# Patient Record
Sex: FEMALE | Race: WHITE | NOT HISPANIC OR LATINO | Employment: OTHER | URBAN - METROPOLITAN AREA
[De-identification: names, ages, dates, MRNs, and addresses within clinical notes are randomized per-mention and may not be internally consistent; named-entity substitution may affect disease eponyms.]

---

## 2023-09-26 PROBLEM — K20.0 EOSINOPHILIC ESOPHAGITIS: Status: ACTIVE | Noted: 2023-07-19

## 2023-09-26 PROBLEM — F32.A DEPRESSION: Status: ACTIVE | Noted: 2023-09-26

## 2023-09-26 PROBLEM — R16.1 SPLENOMEGALY: Status: ACTIVE | Noted: 2020-09-15

## 2023-09-26 PROBLEM — D84.9 IMMUNOCOMPROMISED: Status: ACTIVE | Noted: 2020-06-18

## 2023-09-26 PROBLEM — M54.42 ACUTE LEFT-SIDED LOW BACK PAIN WITH LEFT-SIDED SCIATICA: Status: ACTIVE | Noted: 2022-09-08

## 2023-09-26 PROBLEM — M54.40 CHRONIC BILATERAL LOW BACK PAIN WITH SCIATICA: Status: ACTIVE | Noted: 2023-09-26

## 2023-09-26 PROBLEM — K21.9 GERD (GASTROESOPHAGEAL REFLUX DISEASE): Status: ACTIVE | Noted: 2023-09-26

## 2023-09-26 PROBLEM — K75.81 NASH (NONALCOHOLIC STEATOHEPATITIS): Status: ACTIVE | Noted: 2023-09-26

## 2023-09-26 PROBLEM — G89.29 CHRONIC BILATERAL LOW BACK PAIN WITH SCIATICA: Status: ACTIVE | Noted: 2023-09-26

## 2023-09-26 PROBLEM — K31.84 GASTROPARESIS: Status: ACTIVE | Noted: 2023-09-26

## 2023-09-26 PROBLEM — K44.9 HIATAL HERNIA: Status: ACTIVE | Noted: 2023-09-26

## 2023-09-26 PROBLEM — F98.8 ADD (ATTENTION DEFICIT DISORDER): Status: ACTIVE | Noted: 2023-09-26

## 2023-09-26 PROBLEM — R53.1 WEAKNESS: Status: ACTIVE | Noted: 2023-09-26

## 2023-09-26 PROBLEM — N94.6 DYSMENORRHEA: Status: ACTIVE | Noted: 2023-09-26

## 2023-09-26 PROBLEM — M54.42 CHRONIC BILATERAL LOW BACK PAIN WITH SCIATICA: Status: ACTIVE | Noted: 2023-09-26

## 2023-09-26 PROBLEM — R42 VERTIGO: Status: ACTIVE | Noted: 2023-09-26

## 2023-09-26 PROBLEM — G50.0 TRIGEMINAL NEURALGIA: Status: ACTIVE | Noted: 2023-09-26

## 2023-09-26 PROBLEM — C91.Z0 LARGE GRANULAR LYMPHOCYTE DISORDER: Status: ACTIVE | Noted: 2021-02-15

## 2023-09-26 PROBLEM — G47.33 OSA (OBSTRUCTIVE SLEEP APNEA): Status: ACTIVE | Noted: 2023-09-26

## 2023-09-26 PROBLEM — M79.7 FIBROMYALGIA: Status: ACTIVE | Noted: 2023-09-26

## 2023-09-26 PROBLEM — R53.82 CHRONIC FATIGUE: Status: ACTIVE | Noted: 2023-09-26

## 2023-09-26 PROBLEM — K57.90 DIVERTICULOSIS: Status: ACTIVE | Noted: 2023-09-26

## 2023-09-26 PROBLEM — K57.20 DIVERTICULITIS OF LARGE INTESTINE WITH PERFORATION WITHOUT BLEEDING: Status: ACTIVE | Noted: 2020-06-17

## 2023-09-26 PROBLEM — M54.41 CHRONIC BILATERAL LOW BACK PAIN WITH SCIATICA: Status: ACTIVE | Noted: 2023-09-26

## 2023-09-26 PROBLEM — K57.20 DIVERTICULITIS OF LARGE INTESTINE WITH PERFORATION WITHOUT BLEEDING: Status: RESOLVED | Noted: 2020-06-17 | Resolved: 2023-09-26

## 2023-09-26 PROBLEM — F42.9 OCD (OBSESSIVE COMPULSIVE DISORDER): Status: ACTIVE | Noted: 2023-09-26

## 2023-09-26 PROBLEM — R13.10 DYSPHAGIA: Status: ACTIVE | Noted: 2023-09-26

## 2023-09-26 PROBLEM — D72.829 LEUKOCYTOSIS: Status: ACTIVE | Noted: 2020-12-11

## 2023-09-26 RX ORDER — OXCARBAZEPINE 150 MG/1
150 TABLET, FILM COATED ORAL 2 TIMES DAILY
COMMUNITY
Start: 2023-07-03

## 2023-09-26 RX ORDER — MELATONIN
1000 DAILY
COMMUNITY

## 2023-09-26 RX ORDER — MECLIZINE HYDROCHLORIDE 25 MG/1
25 TABLET ORAL 3 TIMES DAILY PRN
COMMUNITY
Start: 2023-04-27

## 2023-09-26 RX ORDER — FAMOTIDINE 20 MG/1
20 TABLET, FILM COATED ORAL 2 TIMES DAILY
COMMUNITY
Start: 2023-08-08 | End: 2023-09-27

## 2023-09-26 RX ORDER — DOXYCYCLINE HYCLATE 100 MG/1
100 CAPSULE ORAL 2 TIMES DAILY
COMMUNITY
Start: 2023-08-08 | End: 2023-09-27

## 2023-09-26 RX ORDER — LISDEXAMFETAMINE DIMESYLATE CAPSULES 50 MG/1
50 CAPSULE ORAL DAILY
Qty: 30 CAPSULE | Refills: 2 | COMMUNITY
Start: 2023-08-08 | End: 2023-10-15

## 2023-09-26 RX ORDER — FLUTICASONE PROPIONATE 220 UG/1
2 AEROSOL, METERED RESPIRATORY (INHALATION)
COMMUNITY
Start: 2023-08-16

## 2023-09-26 RX ORDER — ONDANSETRON 4 MG/1
4 TABLET, ORALLY DISINTEGRATING ORAL EVERY 8 HOURS PRN
COMMUNITY

## 2023-09-26 RX ORDER — PANTOPRAZOLE SODIUM 40 MG/1
40 TABLET, DELAYED RELEASE ORAL DAILY
COMMUNITY
Start: 2023-08-10

## 2023-09-27 ENCOUNTER — HOSPITAL ENCOUNTER (OUTPATIENT)
Dept: CARDIOLOGY | Facility: HOSPITAL | Age: 38
Discharge: HOME | End: 2023-09-27
Payer: COMMERCIAL

## 2023-09-27 ENCOUNTER — CONSULT (OUTPATIENT)
Dept: BARIATRICS/WEIGHT MGMT | Facility: CLINIC | Age: 38
End: 2023-09-27
Payer: COMMERCIAL

## 2023-09-27 ENCOUNTER — LAB (OUTPATIENT)
Dept: LAB | Facility: HOSPITAL | Age: 38
End: 2023-09-27
Payer: COMMERCIAL

## 2023-09-27 ENCOUNTER — HOSPITAL ENCOUNTER (OUTPATIENT)
Dept: GENERAL RADIOLOGY | Facility: HOSPITAL | Age: 38
Discharge: HOME | End: 2023-09-27
Payer: COMMERCIAL

## 2023-09-27 VITALS — HEIGHT: 65 IN | WEIGHT: 266 LBS | TEMPERATURE: 99.1 F | BODY MASS INDEX: 44.32 KG/M2

## 2023-09-27 DIAGNOSIS — M79.7 FIBROMYALGIA: ICD-10-CM

## 2023-09-27 DIAGNOSIS — G89.29 CHRONIC BILATERAL LOW BACK PAIN WITH SCIATICA, SCIATICA LATERALITY UNSPECIFIED: ICD-10-CM

## 2023-09-27 DIAGNOSIS — F32.89 OTHER DEPRESSION: ICD-10-CM

## 2023-09-27 DIAGNOSIS — E66.01 MORBID OBESITY WITH BODY MASS INDEX (BMI) OF 40.0 TO 44.9 IN ADULT: ICD-10-CM

## 2023-09-27 DIAGNOSIS — G50.0 TRIGEMINAL NEURALGIA: ICD-10-CM

## 2023-09-27 DIAGNOSIS — R13.19 OTHER DYSPHAGIA: ICD-10-CM

## 2023-09-27 DIAGNOSIS — C91.Z0 LARGE GRANULAR LYMPHOCYTE DISORDER: ICD-10-CM

## 2023-09-27 DIAGNOSIS — K44.9 HIATAL HERNIA: ICD-10-CM

## 2023-09-27 DIAGNOSIS — K76.0 FATTY LIVER: ICD-10-CM

## 2023-09-27 DIAGNOSIS — G35 MULTIPLE SCLEROSIS: ICD-10-CM

## 2023-09-27 DIAGNOSIS — R53.82 CHRONIC FATIGUE: ICD-10-CM

## 2023-09-27 DIAGNOSIS — K21.00 GASTROESOPHAGEAL REFLUX DISEASE WITH ESOPHAGITIS WITHOUT HEMORRHAGE: ICD-10-CM

## 2023-09-27 DIAGNOSIS — N94.6 DYSMENORRHEA: ICD-10-CM

## 2023-09-27 DIAGNOSIS — G47.33 OSA (OBSTRUCTIVE SLEEP APNEA): ICD-10-CM

## 2023-09-27 DIAGNOSIS — K20.0 EOSINOPHILIC ESOPHAGITIS: ICD-10-CM

## 2023-09-27 DIAGNOSIS — M54.40 CHRONIC BILATERAL LOW BACK PAIN WITH SCIATICA, SCIATICA LATERALITY UNSPECIFIED: ICD-10-CM

## 2023-09-27 DIAGNOSIS — E66.01 MORBID OBESITY WITH BODY MASS INDEX (BMI) OF 40.0 TO 44.9 IN ADULT: Primary | ICD-10-CM

## 2023-09-27 PROBLEM — M54.42 ACUTE LEFT-SIDED LOW BACK PAIN WITH LEFT-SIDED SCIATICA: Status: RESOLVED | Noted: 2022-09-08 | Resolved: 2023-09-27

## 2023-09-27 PROBLEM — K57.30 PANCOLONIC DIVERTICULOSIS: Status: ACTIVE | Noted: 2023-09-27

## 2023-09-27 PROBLEM — K57.90 DIVERTICULOSIS: Status: RESOLVED | Noted: 2023-09-26 | Resolved: 2023-09-27

## 2023-09-27 PROBLEM — Q79.60 EDS (EHLERS-DANLOS SYNDROME): Status: ACTIVE | Noted: 2023-09-27

## 2023-09-27 PROBLEM — K75.81 NASH (NONALCOHOLIC STEATOHEPATITIS): Status: RESOLVED | Noted: 2023-09-26 | Resolved: 2023-09-27

## 2023-09-27 LAB
HBA1C MFR BLD: 5.1 % (ref 4.8–5.6)
QT INTERVAL: 392 MS
QTC INTERVAL: 405 MS
TSH SERPL DL<=0.05 MIU/L-ACNC: 2.3 UIU/ML (ref 0.27–4.2)

## 2023-09-27 PROCEDURE — 93005 ELECTROCARDIOGRAM TRACING: CPT | Performed by: NURSE PRACTITIONER

## 2023-09-27 PROCEDURE — 84443 ASSAY THYROID STIM HORMONE: CPT

## 2023-09-27 PROCEDURE — 36415 COLL VENOUS BLD VENIPUNCTURE: CPT

## 2023-09-27 PROCEDURE — 83036 HEMOGLOBIN GLYCOSYLATED A1C: CPT

## 2023-09-27 PROCEDURE — 71046 X-RAY EXAM CHEST 2 VIEWS: CPT

## 2023-09-27 NOTE — PROGRESS NOTES
[facility] BARIATRIC SURGERY  [address] [facility] [address]  [facility] [id number] [phone]  [address] [facility] [address]  [facility] [id number] [phone]  Dept: [phone]  9/27/2023      Swetha HATFIELD.  [id number]  [id number]  1985  female      Chief Complaint of weight gain; unable to maintain weight loss    History of Present Illness:   Swetha is a 38 y.o. female who presents today for evaluation, education and consultation regarding weight loss surgery. The patient is interested in the gastric bypass.      Diet History:Swetha has been overweight for at least 25+ years, has been 35 pounds or more overweight for at least 25 years, has been 100 pounds or more overweight for 20 or more years and started dieting at age 17.  The most weight Swetha lost was 35 pounds on Keto and maintained the weight loss for about a year and a half. Swetha describes her eating habits as snacking between meals, eating sweets, drinking caffeine containing drinks, eating large meals, drinking regular soda, and emotional eating. Swetha HATFIELD has tried Atkins, Nutrisystem, Weight Watchers, Fasting, Physician monitored, Dietician monitored, reduced calorie, exercising, OTC medications, prescription medications, previous weight loss surgery, Ketogenic or high fat, and meal replacement shakes among others with success of losing up to 35 pounds, but in each instance regained the weight.     See dietician documentation for complete history.    Bariatric Surgery Evaluation: The patient is being seen for an initial visit for bariatric surgery evaluation and education.     Bariatric Co-morbidities:  sleep apnea, osteoarthritis, back pain, fibromyalgia, GERD, depression, and mental health disease    Patient Active Problem List   Diagnosis    Splenomegaly    Social anxiety disorder    SAVANNAH (obstructive sleep apnea)    Multiple sclerosis    Migraine headache without aura    Large granular  lymphocyte disorder    Leukocytosis    Immunocompromised    Hypersomnolence    Hearing loss in right ear    Eosinophilic esophagitis    ADD (attention deficit disorder)    Chronic fatigue    GERD (gastroesophageal reflux disease)    Hiatal hernia    Dysphagia    Gastroparesis as a child    Dysmenorrhea    Chronic bilateral low back pain with sciatica    Fibromyalgia    Trigeminal neuralgia    OCD (obsessive compulsive disorder)    Vertigo    Weakness    Depression    Fatty liver    EDS (Esdras-Danlos syndrome)    Morbid obesity with body mass index (BMI) of 40.0 to 44.9 in adult    Pancolonic diverticulosis       Past Medical History:   Diagnosis Date    Dysmenorrhea     Dysphagia     Fatty liver     Fibromyalgia     GERD (gastroesophageal reflux disease)     Hiatal hernia     Multiple sclerosis     OCD (obsessive compulsive disorder)     SAVANNAH (obstructive sleep apnea)     Trigeminal neuralgia        Past Surgical History:   Procedure Laterality Date    BONE MARROW ASPIRATE AND BIOPSY WIITH LUMBAR PUNCTURE  2020    COLONOSCOPY  2020    COLPOSCOPY W/ BIOPSY / CURETTAGE  2013    ENDOSCOPY  08/10/2023    dx eosinophilic esophagitis    MOHS SURGERY      melanoma    PORTACATH PLACEMENT  2016    TYMPANOSTOMY      1988,1991,1994       Allergies   Allergen Reactions    Latex Rash    Shellfish Allergy Rash         Current Outpatient Medications:     cholecalciferol (VITAMIN D3) 25 MCG (1000 UT) tablet, Take 1 tablet by mouth Daily., Disp: , Rfl:     fluticasone (FLOVENT HFA) 220 MCG/ACT inhaler, Take 2 puffs by mouth 2 (Two) Times a Day., Disp: , Rfl:     linaclotide (LINZESS) 145 MCG capsule capsule, Take 1 capsule by mouth Daily., Disp: , Rfl:     lisdexamfetamine (VYVANSE) 50 MG capsule, Take 1 capsule by mouth Daily, Disp: 30 capsule, Rfl: 2    meclizine (ANTIVERT) 25 MG tablet, Take 1 tablet by mouth 3 (Three) Times a Day As Needed., Disp: , Rfl:     natalizumab (TYSABRI) 300 MG/15ML injection, Infuse 15 mL into a venous  catheter As Needed (every 42 days)., Disp: , Rfl:     ondansetron ODT (ZOFRAN-ODT) 4 MG disintegrating tablet, Place 1 tablet on the tongue Every 8 (Eight) Hours As Needed for Nausea or Vomiting., Disp: , Rfl:     OXcarbazepine (TRILEPTAL) 150 MG tablet, Take 1 tablet by mouth 2 (Two) Times a Day., Disp: , Rfl:     pantoprazole (PROTONIX) 40 MG EC tablet, Take 1 tablet by mouth Daily., Disp: , Rfl:     Social History     Socioeconomic History    Marital status:    Tobacco Use    Smoking status: Never    Smokeless tobacco: Never   Vaping Use    Vaping Use: Never used   Substance and Sexual Activity    Alcohol use: Never    Drug use: Never    Sexual activity: Defer       Family History   Problem Relation Age of Onset    Sleep apnea Mother     Ovarian cancer Mother     OCD Mother     Hepatitis Mother     Obesity Father     Sleep apnea Father     Diabetes Father     Heart disease Father     Hepatitis Father     Obesity Sister     Bipolar disorder Sister     Heart disease Brother     Sleep apnea Brother     Diabetes Brother     Obesity Brother     Hypertension Brother     Heart attack Brother 37    Sleep apnea Maternal Grandmother     Heart disease Maternal Grandmother     Heart attack Maternal Grandmother     Hypertension Maternal Grandmother     Diabetes Maternal Grandmother     Throat cancer Maternal Grandfather     Hypertension Paternal Grandmother     Hypertension Paternal Grandfather     Lung cancer Paternal Grandfather          Review of Systems:  Review of Systems   Respiratory:  Negative for shortness of breath.    Cardiovascular:  Negative for chest pain.   Gastrointestinal:         GERD, dysphagia   Musculoskeletal:  Positive for arthralgias, back pain and joint swelling.   All other systems reviewed and are negative.    Physical Exam:  Vital Signs:  Weight: 121 kg (266 lb)   Body mass index is 44 kg/m².  Temp: 99.1 °F (37.3 °C)             Physical Exam  Vitals reviewed.   Constitutional:        General: She is not in acute distress.     Appearance: Normal appearance. She is morbidly obese.   HENT:      Head: Normocephalic and atraumatic.      Mouth/Throat:      Mouth: Mucous membranes are moist.   Eyes:      General: No scleral icterus.     Extraocular Movements: Extraocular movements intact.      Conjunctiva/sclera: Conjunctivae normal.      Pupils: Pupils are equal, round, and reactive to light.   Cardiovascular:      Rate and Rhythm: Normal rate and regular rhythm.      Heart sounds: Normal heart sounds. No murmur heard.    No gallop.   Pulmonary:      Effort: Pulmonary effort is normal. No respiratory distress.      Breath sounds: Normal breath sounds.   Abdominal:      General: Bowel sounds are normal. There is no distension.      Palpations: Abdomen is soft. There is no mass.      Tenderness: There is no abdominal tenderness. There is no guarding.   Musculoskeletal:         General: Normal range of motion.      Cervical back: Normal range of motion and neck supple.   Skin:     General: Skin is warm and dry.   Neurological:      General: No focal deficit present.      Mental Status: She is alert and oriented to person, place, and time.   Psychiatric:         Mood and Affect: Mood normal.         Behavior: Behavior normal.         Thought Content: Thought content normal.         Judgment: Judgment normal.          Assessment:         Swetha HATFIELD is a 38 y.o. year old female with medically complicated severe obesity. Weight: 121 kg (266 lb), Body mass index is 44 kg/m². and weight related problems including sleep apnea, osteoarthritis, back pain, fibromyalgia, GERD, depression, and mental health disease.    I explained in detail, potential surgical options of interest to the patient including the RNY gastric bypass, sleeve gastrectomy, and gastric band while considering the patient's medical history. At this time, the patient expressed interest in the Laparoscopic RNY Bypass  All of those procedures  can be performed laparoscopically but there is a chance to convert to open if any technical challenges or complications do occur.  Bariatric surgery is not cosmetic surgery but rather a tool to help a patient make a life-long commitment lifestyle changes including diet, exercise, behavior changes, and taking supplemental vitamins and minerals.    Due to the patient's BMI, history, and co-morbidities related to potential surgical complications were evaluated. Due to Swetha HATFIELD's risk factors female will obtain the following prior to being scheduled for surgical intervention:    A letter of medical support as well as a history and physical must be obtained from her primary care provider. The patient was given a copy of a sample form, that will suffice as their letter to take to their primary are provider.    A referral for pre-operative psychological evaluation was ordered for the patient to evaluate candidacy as well as provide mental health support, should it be warranted before or after surgery.    Consultant notes from neurology and notes from primary care provider, labs, previous EGD were reviewed  and  EKG, CXR, psychology clearance, Hba1c, and TSHwere ordered at this time. These will be drawn and patient will be notified with results. Patient will complete new, pre-operative radiology prior to being scheduled for surgery.    COMPREHENSIVE METABOLIC PANEL (07/03/2023 17:18)    CBC AND DIFFERENTIAL (07/03/2023 17:18)    Swetha HATFIELD has been diagnosed with SAVANNAH. The risks, as they relate to chronic hypercapnia r/t untreated SAVANNAH were discussed with the patient and She verbalized understanding.     ENDOSCOPY  Swetha Hatfield Procedure Date No Time: 8/10/2023   Procedure: Upper GI endoscopy  Pre-op Diagnosis: Dysphagia, Heartburn, Follow-up of eosinophilic   esophagitis  Providers: PAPITO LOVETT MD   Post-op Diagnosis: - Esophageal mucosal changes consistent with   eosinophilic esophagitis. Biopsied.  -  Z-line regular, 38 cm from the incisors.  - Normal stomach. Biopsied.  - Normal examined duodenum.     As this patient is a female of childbearing age she was counseled regarding conception and pregnancy after surgery. Patient was advised that conception and pregnancy within the first 18 months following surgery is not advised due to increased metabolic demands required during pregnancy as well as increased risk of nutrient and vitamin deficiencies which could jeopardize fetal development and birth weight.    The risks, benefits, alternatives, and potential complications of all of the gastric bypass explained in detail including, but not limited to death, anesthesia and medication adverse effect/DVT, pulmonary embolism, trocar site/incisional hernia, wound infection, abdominal infection, bleeding, failure to lose weight or gain weight and change in body image, metabolic complications with calcium, thiamine, vitamin B12, folate, iron, and anemia.    The patient was advised to start a high protein, low fat and low carbohydrate diet  start routine exercise including but not limited to 150 minutes per week. The patient received a resistance band along with a handout of exercises. The patient was given individualized information by our dietician along with handouts.     The patient was given information regarding the KHADIJAH educational video. KHADIJAH is an internet based educational video which explains the sourgical procedure and answers basic questions regarding the procedure. The patient was provided with instructions and a password to watch the video.    The patient understands the surgical procedures and the different surgical options that are available.  She understands the lifestyle changes that would be required after surgery and has agreed to participate in a pre-operative and postoperative weight management program.  She also expressed understanding of possible risks, had several questions answered and desires to  proceed.    I think she is a good candidate for this surgery, and is interested in a gastric bypass.    Encounter Diagnoses   Name Primary?    Morbid obesity with body mass index (BMI) of 40.0 to 44.9 in adult Yes    Gastroesophageal reflux disease with esophagitis without hemorrhage     Hiatal hernia     Eosinophilic esophagitis     Other dysphagia     Fatty liver     Dysmenorrhea     Large granular lymphocyte disorder     Other depression     Fibromyalgia     Chronic bilateral low back pain with sciatica, sciatica laterality unspecified     Trigeminal neuralgia     Multiple sclerosis     SAVANNAH (obstructive sleep apnea)     Chronic fatigue        Plan:    Patient will be evaluated by a bariatric dietician psychologist before undergoing a multidisciplinary review of her candidacy. We discussed weight loss requirements prior to surgery and rationale, as well as other program requirements to ensure the safest approach to surgery. We spent time discussing different surgical procedures and plan of care throughout their lifespan to ensure long term success in achieving and maintaining a healthier weight. Patient will proceed with preoperative lab work, radiology, and endoscopy after obtaining agreed upon specialty, clearances, sleep study, and letter of support from her primary care provider.    Total time spent during this encounter today was 60 minutes and includes preparing for the visit, reviewing tests, performing a medically appropriate examination and/or evaluations, counseling and educating the patient/family/caregiver, ordering medications, tests, or procedures, documenting information in the medical record, and independently interpreting results and communicating that information with the patient/family/caregiver.    Lnea Celaya, APRN  9/27/2023

## 2023-09-28 NOTE — PROGRESS NOTES
"Metabolic and Bariatric Surgery Nutrition Assessment    Patient Name: Swetha HATFIELD    YOB: 1985   Age: 38 y.o.  Sex: female  MRN: [id number]     Assessment Date: 09/27/2023    Procedure considering: sleeve    Anthropometric Measurements  Height: 65.2\"          Current weight: 266 lbs   BMI: 44.00 kg/m²    Patient Goals and Expectations                        Patient's stated weight goal: 160-180 lbs  Reasons for desiring weight loss: improved health and confidence     Medical History  Pertinent diagnosis/problems: GERD, depression, anxiety, back pain, fibromyalgia, multiple sclerosis, sleep apnea    Weight History  Highest adult weight: 288 lbs                              Lowest adult weight: 180 lbs  Onset of obesity: adolescence   Possible triggers or life events that may have led to weight gain: genetics, always feels hungry     Previous Weight Loss Efforts  Patient has tried to lose weight in the past including Weight Watchers, Nutrisystem, Slim Fast, keto, Atkins, calorie counting, low carbohydrate, low fat, fasting, diet center, prescription medication and exercise.   Patient has lost up to 35 lbs on diets, but was unable to maintain this long term.     Diet History  Patient is eating 1-2 meals and 2-3 snacks daily.     24 Hour Recall  Breakfast: bagel with egg or may skip   Lunch: none   Dinner: meat, vegetables, starch or take out   Snacks: fruit, vegetables, Fiber One bars, Kind bar or Miles bar    Beverages: water, energy drink, soda on rare occasion    Eating out: daily   Vitamins/supplements: magnesium, B complex  Diet restrictions or food allergies: shellfish allergy, soy sensitivity     Patient identifies problem areas to be physical hunger, over consumption, eating large portions, grazing, sweets cravings, eating out of boredom, eating in response to anxiety and inactivity.      Physical Activity  Work-related activity: sedentary   Planned exercise: none   Barriers to activity: none "      Nutrition Intervention  Pre and post op nutrition education and coaching for behavior change completed. Program materials provided. Emphasized the importance of taking in at least 70 g protein and 64 oz fluid daily. Discussed personal habits and lifestyle behaviors that may influence weight loss efforts. Self monitoring strategies such as keeping a food journal were encouraged. Patient verbalized understanding and questions were answered.  Short term goals: prioritize high protein foods, decrease/eliminate carbonated beverages     Recommendations/Plan  Patient will be evaluated by multidisciplinary team before undergoing a review of their candidacy.  Additional nutrition counseling available and encouraged both pre and post op.   Patient demonstrated a good comprehension of diet requirements and commitment to make healthy changes.   Swetha HATFIELD appears to be a suitable candidate for bariatric surgery from a nutritional standpoint.      Misti Bolanos RD  09/28/23  10:58 EDT

## 2023-10-06 ENCOUNTER — TELEPHONE (OUTPATIENT)
Dept: BARIATRICS/WEIGHT MGMT | Facility: CLINIC | Age: 38
End: 2023-10-06
Payer: COMMERCIAL

## 2023-11-21 ENCOUNTER — PREP FOR SURGERY (OUTPATIENT)
Dept: OTHER | Facility: HOSPITAL | Age: 38
End: 2023-11-21
Payer: COMMERCIAL

## 2023-11-21 DIAGNOSIS — E66.01 OBESITY, CLASS III, BMI 40-49.9 (MORBID OBESITY): Primary | ICD-10-CM

## 2023-11-21 PROBLEM — E66.813 OBESITY, CLASS III, BMI 40-49.9 (MORBID OBESITY): Status: ACTIVE | Noted: 2023-11-21

## 2023-11-21 RX ORDER — SODIUM CHLORIDE 9 MG/ML
40 INJECTION, SOLUTION INTRAVENOUS AS NEEDED
OUTPATIENT
Start: 2023-11-21

## 2023-11-21 RX ORDER — SODIUM CHLORIDE 0.9 % (FLUSH) 0.9 %
1-10 SYRINGE (ML) INJECTION AS NEEDED
OUTPATIENT
Start: 2023-11-21

## 2023-11-21 RX ORDER — METOCLOPRAMIDE HYDROCHLORIDE 5 MG/ML
10 INJECTION INTRAMUSCULAR; INTRAVENOUS ONCE
OUTPATIENT
Start: 2023-11-21 | End: 2023-11-21

## 2023-11-21 RX ORDER — CHLORHEXIDINE GLUCONATE ORAL RINSE 1.2 MG/ML
15 SOLUTION DENTAL SEE ADMIN INSTRUCTIONS
OUTPATIENT
Start: 2023-11-21

## 2023-11-21 RX ORDER — PROMETHAZINE HYDROCHLORIDE 12.5 MG/1
12.5 TABLET ORAL ONCE
OUTPATIENT
Start: 2023-11-21 | End: 2023-11-21

## 2023-11-21 RX ORDER — PROMETHAZINE HYDROCHLORIDE 25 MG/1
25 SUPPOSITORY RECTAL ONCE
OUTPATIENT
Start: 2023-11-21

## 2023-11-21 RX ORDER — SCOLOPAMINE TRANSDERMAL SYSTEM 1 MG/1
1 PATCH, EXTENDED RELEASE TRANSDERMAL CONTINUOUS
OUTPATIENT
Start: 2023-11-21 | End: 2023-11-24

## 2023-11-21 RX ORDER — CEFAZOLIN SODIUM IN 0.9 % NACL 3 G/100 ML
3 INTRAVENOUS SOLUTION, PIGGYBACK (ML) INTRAVENOUS ONCE
OUTPATIENT
Start: 2023-11-21 | End: 2023-11-21

## 2023-11-21 RX ORDER — SODIUM CHLORIDE, SODIUM LACTATE, POTASSIUM CHLORIDE, CALCIUM CHLORIDE 600; 310; 30; 20 MG/100ML; MG/100ML; MG/100ML; MG/100ML
100 INJECTION, SOLUTION INTRAVENOUS CONTINUOUS
OUTPATIENT
Start: 2023-11-21

## 2023-11-21 RX ORDER — PANTOPRAZOLE SODIUM 40 MG/10ML
40 INJECTION, POWDER, LYOPHILIZED, FOR SOLUTION INTRAVENOUS ONCE
OUTPATIENT
Start: 2023-11-21 | End: 2023-11-21

## 2023-11-21 RX ORDER — SODIUM CHLORIDE 0.9 % (FLUSH) 0.9 %
10 SYRINGE (ML) INJECTION EVERY 12 HOURS SCHEDULED
OUTPATIENT
Start: 2023-11-21

## 2023-11-21 RX ORDER — GABAPENTIN 300 MG/1
300 CAPSULE ORAL ONCE
OUTPATIENT
Start: 2023-11-21 | End: 2023-11-21

## 2023-11-22 ENCOUNTER — CONSULT (OUTPATIENT)
Dept: BARIATRICS/WEIGHT MGMT | Facility: CLINIC | Age: 38
End: 2023-11-22
Payer: COMMERCIAL

## 2023-11-22 ENCOUNTER — LAB (OUTPATIENT)
Dept: LAB | Facility: HOSPITAL | Age: 38
End: 2023-11-22
Payer: COMMERCIAL

## 2023-11-22 VITALS
HEIGHT: 65 IN | HEART RATE: 76 BPM | TEMPERATURE: 98.2 F | DIASTOLIC BLOOD PRESSURE: 82 MMHG | BODY MASS INDEX: 44.48 KG/M2 | SYSTOLIC BLOOD PRESSURE: 140 MMHG | WEIGHT: 267 LBS

## 2023-11-22 DIAGNOSIS — K21.9 GASTROESOPHAGEAL REFLUX DISEASE, UNSPECIFIED WHETHER ESOPHAGITIS PRESENT: ICD-10-CM

## 2023-11-22 DIAGNOSIS — Q79.60 EDS (EHLERS-DANLOS SYNDROME): ICD-10-CM

## 2023-11-22 DIAGNOSIS — G47.33 OSA (OBSTRUCTIVE SLEEP APNEA): ICD-10-CM

## 2023-11-22 DIAGNOSIS — K44.9 HIATAL HERNIA: ICD-10-CM

## 2023-11-22 DIAGNOSIS — R53.82 CHRONIC FATIGUE: ICD-10-CM

## 2023-11-22 DIAGNOSIS — E66.01 OBESITY, CLASS III, BMI 40-49.9 (MORBID OBESITY): ICD-10-CM

## 2023-11-22 DIAGNOSIS — K76.0 FATTY LIVER: ICD-10-CM

## 2023-11-22 DIAGNOSIS — K31.84 GASTROPARESIS: ICD-10-CM

## 2023-11-22 DIAGNOSIS — G35 MULTIPLE SCLEROSIS: ICD-10-CM

## 2023-11-22 DIAGNOSIS — E66.01 OBESITY, CLASS III, BMI 40-49.9 (MORBID OBESITY): Primary | ICD-10-CM

## 2023-11-22 PROBLEM — R13.10 DYSPHAGIA: Status: RESOLVED | Noted: 2023-09-26 | Resolved: 2023-11-22

## 2023-11-22 LAB
ALBUMIN SERPL-MCNC: 4.6 G/DL (ref 3.5–5.2)
ALBUMIN/GLOB SERPL: 2.2 G/DL
ALP SERPL-CCNC: 89 U/L (ref 39–117)
ALT SERPL W P-5'-P-CCNC: 30 U/L (ref 1–33)
ANION GAP SERPL CALCULATED.3IONS-SCNC: 11 MMOL/L (ref 5–15)
AST SERPL-CCNC: 26 U/L (ref 1–32)
BILIRUB SERPL-MCNC: 0.5 MG/DL (ref 0–1.2)
BUN SERPL-MCNC: 12 MG/DL (ref 6–20)
BUN/CREAT SERPL: 15 (ref 7–25)
CALCIUM SPEC-SCNC: 9.3 MG/DL (ref 8.6–10.5)
CHLORIDE SERPL-SCNC: 101 MMOL/L (ref 98–107)
CO2 SERPL-SCNC: 25 MMOL/L (ref 22–29)
CREAT SERPL-MCNC: 0.8 MG/DL (ref 0.57–1)
DEPRECATED RDW RBC AUTO: 39.7 FL (ref 37–54)
EGFRCR SERPLBLD CKD-EPI 2021: 97.5 ML/MIN/1.73
ERYTHROCYTE [DISTWIDTH] IN BLOOD BY AUTOMATED COUNT: 13.2 % (ref 12.3–15.4)
GLOBULIN UR ELPH-MCNC: 2.1 GM/DL
GLUCOSE SERPL-MCNC: 97 MG/DL (ref 65–99)
HCT VFR BLD AUTO: 38 % (ref 34–46.6)
HGB BLD-MCNC: 12.9 G/DL (ref 12–15.9)
MCH RBC QN AUTO: 28.4 PG (ref 26.6–33)
MCHC RBC AUTO-ENTMCNC: 33.9 G/DL (ref 31.5–35.7)
MCV RBC AUTO: 83.5 FL (ref 79–97)
PLATELET # BLD AUTO: 258 10*3/MM3 (ref 140–450)
PMV BLD AUTO: 11.1 FL (ref 6–12)
POTASSIUM SERPL-SCNC: 4.1 MMOL/L (ref 3.5–5.2)
PROT SERPL-MCNC: 6.7 G/DL (ref 6–8.5)
RBC # BLD AUTO: 4.55 10*6/MM3 (ref 3.77–5.28)
SODIUM SERPL-SCNC: 137 MMOL/L (ref 136–145)
WBC NRBC COR # BLD AUTO: 7.89 10*3/MM3 (ref 3.4–10.8)

## 2023-11-22 PROCEDURE — 85027 COMPLETE CBC AUTOMATED: CPT

## 2023-11-22 PROCEDURE — 80053 COMPREHEN METABOLIC PANEL: CPT

## 2023-11-22 PROCEDURE — 36415 COLL VENOUS BLD VENIPUNCTURE: CPT

## 2023-11-22 RX ORDER — ENOXAPARIN SODIUM 100 MG/ML
40 INJECTION SUBCUTANEOUS EVERY 12 HOURS SCHEDULED
Qty: 11.2 ML | Refills: 0 | Status: SHIPPED | OUTPATIENT
Start: 2023-11-22 | End: 2023-12-06

## 2023-11-22 RX ORDER — URSODIOL 300 MG/1
300 CAPSULE ORAL 2 TIMES DAILY
Qty: 60 CAPSULE | Refills: 5 | Status: SHIPPED | OUTPATIENT
Start: 2023-11-22

## 2023-11-22 NOTE — PATIENT INSTRUCTIONS
Bariatric Manual    You were provided a manual specific to the procedure that you have chosen.  Please refer to that with any questions or call the office at [phone]

## 2023-11-22 NOTE — H&P
Bariatric Consult:  Referred by No primary care provider on file.    Swetha HATFIELD is here today for consult on Consult (Consult [facility])      History of Present Illness:     Swetha HATFIELD is a 38 y.o. female with morbid obesity with co-morbidities including back pain, knee pain, and GERD who presents for surgical consultation for the above procedure. Swetha has completed the initial intake visit and has been examined by our nurse practitioner, dietician, psychologist and underwent the extensive educational teaching process under the guidance of our bariatric coordinator and myself. Swetha also has seen or if has not yet will see the educational video [email or web address] on the surgical procedure if available. Swetha attended today more educational teaching from our bariatric coordinator and myself. Swetha has had an extensive medical workup including a visit with their primary care physician, EKG, chest radiograph, blood work, EGD or UGI and possibly further testing. These have been reviewed by me and discussed with the patient. Swetha is now ready to proceed with surgery. Swetha presently denies nausea, vomiting, fever, chills, chest pain, shortness of air, melena, hematochezia, hemetemesis, dysuria, frequency, hematuria.     Past Medical History:   Diagnosis Date    Dysmenorrhea     Dysphagia     Fatty liver     Fibromyalgia     GERD (gastroesophageal reflux disease)     Hiatal hernia     Multiple sclerosis     OCD (obsessive compulsive disorder)     SAVANNAH (obstructive sleep apnea)     Trigeminal neuralgia        Encounter Diagnoses   Name Primary?    Obesity, Class III, BMI 40-49.9 (morbid obesity) Yes    Fatty liver     SAVANNAH (obstructive sleep apnea)     Chronic fatigue     Gastroesophageal reflux disease, unspecified whether esophagitis present     Hiatal hernia     Gastroparesis as a child     EDS (Esdras-Danlos syndrome)     Multiple sclerosis        Past Surgical History:   Procedure Laterality Date    BONE MARROW ASPIRATE  AND BIOPSY WIITH LUMBAR PUNCTURE  2020    COLONOSCOPY  2020    COLPOSCOPY W/ BIOPSY / CURETTAGE  2013    ENDOSCOPY  08/10/2023    dx eosinophilic esophagitis    MOHS SURGERY      melanoma    PORTACATH PLACEMENT  2016    TYMPANOSTOMY      1988,1991,1994         * No active hospital problems. *      Allergies   Allergen Reactions    Latex Rash    Shellfish Allergy Rash         Current Outpatient Medications:     cholecalciferol (VITAMIN D3) 25 MCG (1000 UT) tablet, Take 1 tablet by mouth Daily., Disp: , Rfl:     fluticasone (FLOVENT HFA) 220 MCG/ACT inhaler, Take 2 puffs by mouth 2 (Two) Times a Day., Disp: , Rfl:     linaclotide (LINZESS) 145 MCG capsule capsule, Take 1 capsule by mouth Daily., Disp: , Rfl:     meclizine (ANTIVERT) 25 MG tablet, Take 1 tablet by mouth 3 (Three) Times a Day As Needed., Disp: , Rfl:     natalizumab (TYSABRI) 300 MG/15ML injection, Infuse 15 mL into a venous catheter As Needed (every 42 days)., Disp: , Rfl:     ondansetron ODT (ZOFRAN-ODT) 4 MG disintegrating tablet, Place 1 tablet on the tongue Every 8 (Eight) Hours As Needed for Nausea or Vomiting., Disp: , Rfl:     OXcarbazepine (TRILEPTAL) 150 MG tablet, Take 1 tablet by mouth 2 (Two) Times a Day., Disp: , Rfl:     pantoprazole (PROTONIX) 40 MG EC tablet, Take 1 tablet by mouth Daily., Disp: , Rfl:     Enoxaparin Sodium (LOVENOX) 40 MG/0.4ML solution prefilled syringe syringe, Inject 0.4 mL under the skin into the appropriate area as directed Every 12 (Twelve) Hours for 14 days. Start after surgery unless instructed otherwise, Disp: 11.2 mL, Rfl: 0    folic acid-vit B6-vit B12 (FOLBEE) 2.5-25-1 MG tablet tablet, Take 1 tablet by mouth Daily., Disp: 40 tablet, Rfl: 0    lisdexamfetamine (VYVANSE) 50 MG capsule, Take 1 capsule by mouth Daily, Disp: 30 capsule, Rfl: 2    ursodiol (Actigall) 300 MG capsule, Take 1 capsule by mouth 2 (Two) Times a Day., Disp: 60 capsule, Rfl: 5    Social History     Socioeconomic History    Marital  status:    Tobacco Use    Smoking status: Never    Smokeless tobacco: Never   Vaping Use    Vaping Use: Never used   Substance and Sexual Activity    Alcohol use: Never    Drug use: Never    Sexual activity: Yes     Partners: Male     Birth control/protection: Condom       Family History   Problem Relation Age of Onset    Sleep apnea Mother     Ovarian cancer Mother     OCD Mother     Hepatitis Mother     Obesity Father     Sleep apnea Father     Diabetes Father     Heart disease Father     Hepatitis Father     Obesity Sister     Bipolar disorder Sister     Heart disease Brother     Sleep apnea Brother     Diabetes Brother     Obesity Brother     Hypertension Brother     Heart attack Brother 37    Sleep apnea Maternal Grandmother     Heart disease Maternal Grandmother     Heart attack Maternal Grandmother     Hypertension Maternal Grandmother     Diabetes Maternal Grandmother     Throat cancer Maternal Grandfather     Hypertension Paternal Grandmother     Hypertension Paternal Grandfather     Lung cancer Paternal Grandfather        Review of Systems:  Review of Systems   Constitutional:  Positive for fatigue.   Musculoskeletal:  Positive for arthralgias and back pain.   All other systems reviewed and are negative.      Physical Exam:  Vital Signs:  Weight: 121 kg (267 lb)   Body mass index is 44.16 kg/m².  Temp: 98.2 °F (36.8 °C)   Heart Rate: 76   BP: 140/82     Physical Exam  Vitals reviewed.   HENT:      Head: Normocephalic and atraumatic.      Mouth/Throat:      Mouth: Mucous membranes are moist.      Pharynx: Oropharynx is clear.   Eyes:      General: No scleral icterus.     Extraocular Movements: Extraocular movements intact.      Conjunctiva/sclera: Conjunctivae normal.      Pupils: Pupils are equal, round, and reactive to light.   Neck:      Thyroid: No thyromegaly.   Cardiovascular:      Rate and Rhythm: Normal rate.   Pulmonary:      Effort: Pulmonary effort is normal. No respiratory distress.       Breath sounds: Normal breath sounds. No stridor. No wheezing or rhonchi.   Abdominal:      General: Bowel sounds are normal.      Palpations: Abdomen is soft.      Tenderness: There is no abdominal tenderness. There is no right CVA tenderness, left CVA tenderness, guarding or rebound.      Hernia: No hernia is present.   Musculoskeletal:         General: Normal range of motion.      Cervical back: Normal range of motion and neck supple.   Lymphadenopathy:      Cervical: No cervical adenopathy.   Skin:     General: Skin is warm and dry.      Findings: No erythema.   Neurological:      Mental Status: She is alert and oriented to person, place, and time.   Psychiatric:         Mood and Affect: Mood normal.         Behavior: Behavior normal.         Thought Content: Thought content normal.         Judgment: Judgment normal.         Assessment:    Swetha HATFIELD is a 38 y.o. year old female with medically complicated severe obesity with a BMI of Body mass index is 44.16 kg/m². and multiple co-morbidities listed in the encounter diagnosis.    I think she is an appropriate candidate for this surgery, and is ready to proceed.    Plan/Discussion/Summary:  Probable hiatal hernia.  Patient has x-rays with hiatal hernia noted.  Patient does take PPI and is symptomatic.  Patient with history of multiple sclerosis and Erler's Danlos syndrome.  Patient has researched both procedures and was thinking about gastric bypass and gastric sleeve and decided on gastric bypass.  She does understand increased risk of complications with her history of Erler's Danlos syndrome and multiple sclerosis.    The patient has returned to the office for a surgical consultation and has requested to proceed with the Cresencio-en-Y  gastric bypass.  I have had the opportunity to obtain the history, examine the patient and review the patient's chart.  The procedure could be done laparoscopically and or robotically.    The patient understands that surgery is a  tool and that weight loss is not guaranteed but only seen in the context of appropriate use, regular follow up, exercise and making appropriate food choices.      I have personally discussed the potential complications of the laparoscopic gastric bypass with this patient.  The patient is well aware of the potential complications of the surgery that include but not limited to bleeding, infections, deep venous thrombosis, pulmonary embolism, pulmonary complications such as pneumonia, cardiac events, hernias, small bowel obstruction, damage to the spleen or other organs, bowel injury, disfiguring scars, failure to lose weight, need for additional surgery, conversion to an open procedure and death.  I also discussed the risks that apply in particular to the gastric bypass such as the leaking of stomach and/or intestinal contents at the staple or suture line, the development of an intra-abdominal abscess,  strictures, ulcers, and vitamin/mineral deficiencies.  The patient was strongly advised to avoid smoking and the use of non-steroidal anti-inflammatory drugs such as ibuprofen, Motrin and Advil in the postoperative period and understands the increased risk of ulcer formation, perforation, death if they did not stop the use of these medications/substances.     The risks, benefits, potential complications and alternative therapies were discussed at great lengths as outlined in our extensive consent forms, online consent, and educational teaching processes.      The patient has confirmed participation in the program's extensive educational activities.      All questions and concerns were answered to the patient's satisfaction.  The patient now wishes to proceed with surgery.    The patient agreed to a postoperative course of anticoagulant therapy.    I instructed patient that the surgery could be laparoscopically and/or robotically.    I instructed patient to start on a H2 blocker or proton pump inhibitor if not already  on one of these medications.    I explained in detail the procedures that are in the consent.  All of these procedures have a chance to convert to open if any technical challenges or complications do occur.  Bariatric surgery is not cosmetic surgery but rather a tool to help a patient make a life-long commitment lifestyle change including diet, exercise, behavior changes, and taking supplemental vitamins and minerals.    Problems after surgery may require more operations to correct them.    The risks, benefits, alternatives, and potential complications of all of the procedures were explained in detail including, but not limited to death, anesthesia and medication adverse effect, deep venous thrombosis, pulmonary embolism, trocar site/incisional hernia, wound infection, abdominal infection, bleeding, failure to lose weight, gain weight, a change in body image, metabolic complications with vitamin deficiences and anemia.    Weight loss expectations were discussed with the patient in detail. The weight loss operations most commonly performed are the sleeve gastrectomy and the Cresencio-en-Y gastric bypass. These operations result in weight losses up to approximately 25-35% of initial body weight 12 to 24 months after surgery with the gastric bypass usually the higher percent of weight loss but depends on patient using the tool.    For the gastric bypass and loop duodenal switch (CARTER-S) the risks include but not limited to the following early complications:  Anastomotic leak/peritonitis, Cresencio/Alimentary/biliopancreatic limb obstruction, severe & minor wound infection/seroma, and nausea/vomiting.  Late complications can include but are not limited to malnutrition, vitamin deficiencies, frequent loose stools,  stomal stenosis, marginal ulcer, bowel obstruction, intussusception, internal, and incisional hernia.    Regarding the gastric sleeve, there is higher risk of dysphagia and reflux leading to possible Disla's esophagus  compared to a gastric bypass, as well as risk of internal visceral/organ injury, splenectomy, bleeding, infection, leak (which could require further intervention possible conversion to Cresencio-en-Y gastric bypass), stenosis and possibility of regaining weight.    Swetha was counseled regarding diagnostic results, instructions for management, risk factor reductions, prognosis, patient and family education, impressions, risks and benefits of treatment options and importance of compliance with treatment. Total time of the encounter was over 45 minutes counseling the patient regarding the procedure as above and reviewing as well as ordering labs, medications and the procedure.  The chart was also reviewed prior to seeing the patient reviewing previous testing, studies and labs.    Swetha understands the surgical procedures and the different surgical options that are available.  She understands the lifestyle changes that are required after surgery and has agreed to follow the guidelines outlined in the weight management program.  She also expressed understanding of the risks involved and had all of female questions answered and desires to proceed.      Eliel Acosta MD  11/22/2023

## 2023-11-27 ENCOUNTER — PREP FOR SURGERY (OUTPATIENT)
Dept: OTHER | Facility: HOSPITAL | Age: 38
End: 2023-11-27
Payer: COMMERCIAL

## 2023-11-27 DIAGNOSIS — E66.01 OBESITY, CLASS III, BMI 40-49.9 (MORBID OBESITY): Primary | ICD-10-CM

## 2023-11-27 RX ORDER — SODIUM CHLORIDE 9 MG/ML
40 INJECTION, SOLUTION INTRAVENOUS AS NEEDED
OUTPATIENT
Start: 2023-11-27

## 2023-11-27 RX ORDER — METOCLOPRAMIDE HYDROCHLORIDE 5 MG/ML
10 INJECTION INTRAMUSCULAR; INTRAVENOUS ONCE
OUTPATIENT
Start: 2023-11-27 | End: 2023-11-27

## 2023-11-27 RX ORDER — SCOLOPAMINE TRANSDERMAL SYSTEM 1 MG/1
1 PATCH, EXTENDED RELEASE TRANSDERMAL CONTINUOUS
OUTPATIENT
Start: 2023-11-27 | End: 2023-11-30

## 2023-11-27 RX ORDER — PROMETHAZINE HYDROCHLORIDE 12.5 MG/1
12.5 TABLET ORAL ONCE
OUTPATIENT
Start: 2023-11-27 | End: 2023-11-27

## 2023-11-27 RX ORDER — CHLORHEXIDINE GLUCONATE ORAL RINSE 1.2 MG/ML
15 SOLUTION DENTAL SEE ADMIN INSTRUCTIONS
OUTPATIENT
Start: 2023-11-27

## 2023-11-27 RX ORDER — GABAPENTIN 300 MG/1
300 CAPSULE ORAL ONCE
OUTPATIENT
Start: 2023-11-27 | End: 2023-11-27

## 2023-11-27 RX ORDER — SODIUM CHLORIDE, SODIUM LACTATE, POTASSIUM CHLORIDE, CALCIUM CHLORIDE 600; 310; 30; 20 MG/100ML; MG/100ML; MG/100ML; MG/100ML
100 INJECTION, SOLUTION INTRAVENOUS CONTINUOUS
OUTPATIENT
Start: 2023-11-27

## 2023-11-27 RX ORDER — SODIUM CHLORIDE 0.9 % (FLUSH) 0.9 %
3-10 SYRINGE (ML) INJECTION AS NEEDED
OUTPATIENT
Start: 2023-11-27

## 2023-11-27 RX ORDER — PROMETHAZINE HYDROCHLORIDE 25 MG/1
25 SUPPOSITORY RECTAL ONCE
OUTPATIENT
Start: 2023-11-27

## 2023-11-27 RX ORDER — SODIUM CHLORIDE 0.9 % (FLUSH) 0.9 %
3 SYRINGE (ML) INJECTION EVERY 12 HOURS SCHEDULED
OUTPATIENT
Start: 2023-11-27

## 2023-11-27 RX ORDER — PANTOPRAZOLE SODIUM 40 MG/10ML
40 INJECTION, POWDER, LYOPHILIZED, FOR SOLUTION INTRAVENOUS ONCE
OUTPATIENT
Start: 2023-11-27 | End: 2023-11-27

## 2023-11-27 RX ORDER — CEFAZOLIN SODIUM IN 0.9 % NACL 3 G/100 ML
3 INTRAVENOUS SOLUTION, PIGGYBACK (ML) INTRAVENOUS ONCE
OUTPATIENT
Start: 2023-11-27 | End: 2023-11-27

## 2023-11-28 ENCOUNTER — DOCUMENTATION (OUTPATIENT)
Dept: BARIATRICS/WEIGHT MGMT | Facility: CLINIC | Age: 38
End: 2023-11-28
Payer: COMMERCIAL

## 2023-11-28 NOTE — PROGRESS NOTES
Patient has decided to proceed with the gastric sleeve instead of gastric bypass. We did discuss in detail both procedures and after further research she has decided that the sleeve is the better option.

## 2023-12-04 ENCOUNTER — PREP FOR SURGERY (OUTPATIENT)
Dept: OTHER | Facility: HOSPITAL | Age: 38
End: 2023-12-04
Payer: COMMERCIAL

## 2023-12-04 DIAGNOSIS — K44.9 HIATAL HERNIA: ICD-10-CM

## 2023-12-04 DIAGNOSIS — E66.01 OBESITY, CLASS III, BMI 40-49.9 (MORBID OBESITY): Primary | ICD-10-CM

## 2023-12-04 RX ORDER — GABAPENTIN 300 MG/1
300 CAPSULE ORAL ONCE
OUTPATIENT
Start: 2023-12-04 | End: 2023-12-04

## 2023-12-04 RX ORDER — CEFAZOLIN SODIUM IN 0.9 % NACL 3 G/100 ML
3 INTRAVENOUS SOLUTION, PIGGYBACK (ML) INTRAVENOUS ONCE
OUTPATIENT
Start: 2023-12-04 | End: 2023-12-04

## 2023-12-04 RX ORDER — SODIUM CHLORIDE, SODIUM LACTATE, POTASSIUM CHLORIDE, CALCIUM CHLORIDE 600; 310; 30; 20 MG/100ML; MG/100ML; MG/100ML; MG/100ML
100 INJECTION, SOLUTION INTRAVENOUS CONTINUOUS
OUTPATIENT
Start: 2023-12-04

## 2023-12-04 RX ORDER — SODIUM CHLORIDE 9 MG/ML
40 INJECTION, SOLUTION INTRAVENOUS AS NEEDED
OUTPATIENT
Start: 2023-12-04

## 2023-12-04 RX ORDER — PROMETHAZINE HYDROCHLORIDE 25 MG/1
25 SUPPOSITORY RECTAL ONCE
OUTPATIENT
Start: 2023-12-04

## 2023-12-04 RX ORDER — METOCLOPRAMIDE HYDROCHLORIDE 5 MG/ML
10 INJECTION INTRAMUSCULAR; INTRAVENOUS ONCE
OUTPATIENT
Start: 2023-12-04 | End: 2023-12-04

## 2023-12-04 RX ORDER — SCOLOPAMINE TRANSDERMAL SYSTEM 1 MG/1
1 PATCH, EXTENDED RELEASE TRANSDERMAL CONTINUOUS
OUTPATIENT
Start: 2023-12-04 | End: 2023-12-07

## 2023-12-04 RX ORDER — SODIUM CHLORIDE 0.9 % (FLUSH) 0.9 %
3 SYRINGE (ML) INJECTION EVERY 12 HOURS SCHEDULED
OUTPATIENT
Start: 2023-12-04

## 2023-12-04 RX ORDER — SODIUM CHLORIDE 0.9 % (FLUSH) 0.9 %
3-10 SYRINGE (ML) INJECTION AS NEEDED
OUTPATIENT
Start: 2023-12-04

## 2023-12-04 RX ORDER — PANTOPRAZOLE SODIUM 40 MG/10ML
40 INJECTION, POWDER, LYOPHILIZED, FOR SOLUTION INTRAVENOUS ONCE
OUTPATIENT
Start: 2023-12-04 | End: 2023-12-04

## 2023-12-04 RX ORDER — PROMETHAZINE HYDROCHLORIDE 12.5 MG/1
12.5 TABLET ORAL ONCE
OUTPATIENT
Start: 2023-12-04 | End: 2023-12-04

## 2023-12-04 RX ORDER — CHLORHEXIDINE GLUCONATE ORAL RINSE 1.2 MG/ML
15 SOLUTION DENTAL SEE ADMIN INSTRUCTIONS
OUTPATIENT
Start: 2023-12-04

## 2023-12-13 ENCOUNTER — HOSPITAL ENCOUNTER (OUTPATIENT)
Facility: HOSPITAL | Age: 38
Setting detail: HOSPITAL OUTPATIENT SURGERY
Discharge: HOME | End: 2023-12-13
Attending: SURGERY | Admitting: SURGERY
Payer: COMMERCIAL

## 2023-12-13 ENCOUNTER — ANESTHESIA EVENT (OUTPATIENT)
Dept: PERIOP | Facility: HOSPITAL | Age: 38
End: 2023-12-13
Payer: COMMERCIAL

## 2023-12-13 ENCOUNTER — ANESTHESIA (OUTPATIENT)
Dept: PERIOP | Facility: HOSPITAL | Age: 38
End: 2023-12-13
Payer: COMMERCIAL

## 2023-12-13 VITALS
DIASTOLIC BLOOD PRESSURE: 62 MMHG | SYSTOLIC BLOOD PRESSURE: 117 MMHG | OXYGEN SATURATION: 100 % | RESPIRATION RATE: 16 BRPM | TEMPERATURE: 97.5 F | HEART RATE: 80 BPM

## 2023-12-13 DIAGNOSIS — F40.10 SOCIAL ANXIETY DISORDER: ICD-10-CM

## 2023-12-13 DIAGNOSIS — K20.0 EOSINOPHILIC ESOPHAGITIS: ICD-10-CM

## 2023-12-13 DIAGNOSIS — G50.0 TRIGEMINAL NEURALGIA: ICD-10-CM

## 2023-12-13 DIAGNOSIS — R42 VERTIGO: ICD-10-CM

## 2023-12-13 DIAGNOSIS — G47.10 HYPERSOMNOLENCE: ICD-10-CM

## 2023-12-13 DIAGNOSIS — G35 MULTIPLE SCLEROSIS: ICD-10-CM

## 2023-12-13 DIAGNOSIS — R16.1 SPLENOMEGALY: ICD-10-CM

## 2023-12-13 DIAGNOSIS — M54.40 CHRONIC BILATERAL LOW BACK PAIN WITH SCIATICA, SCIATICA LATERALITY UNSPECIFIED: ICD-10-CM

## 2023-12-13 DIAGNOSIS — D84.9 IMMUNOCOMPROMISED: ICD-10-CM

## 2023-12-13 DIAGNOSIS — Q79.60 EDS (EHLERS-DANLOS SYNDROME): ICD-10-CM

## 2023-12-13 DIAGNOSIS — K21.9 GASTROESOPHAGEAL REFLUX DISEASE, UNSPECIFIED WHETHER ESOPHAGITIS PRESENT: ICD-10-CM

## 2023-12-13 DIAGNOSIS — F32.89 OTHER DEPRESSION: ICD-10-CM

## 2023-12-13 DIAGNOSIS — K57.30 PANCOLONIC DIVERTICULOSIS: Primary | ICD-10-CM

## 2023-12-13 DIAGNOSIS — R53.82 CHRONIC FATIGUE: ICD-10-CM

## 2023-12-13 DIAGNOSIS — G47.33 OSA (OBSTRUCTIVE SLEEP APNEA): ICD-10-CM

## 2023-12-13 DIAGNOSIS — E66.01 OBESITY, CLASS III, BMI 40-49.9 (MORBID OBESITY): ICD-10-CM

## 2023-12-13 DIAGNOSIS — M79.7 FIBROMYALGIA: ICD-10-CM

## 2023-12-13 DIAGNOSIS — K76.0 FATTY LIVER: ICD-10-CM

## 2023-12-13 DIAGNOSIS — N94.6 DYSMENORRHEA: ICD-10-CM

## 2023-12-13 DIAGNOSIS — G89.29 CHRONIC BILATERAL LOW BACK PAIN WITH SCIATICA, SCIATICA LATERALITY UNSPECIFIED: ICD-10-CM

## 2023-12-13 DIAGNOSIS — K31.84 GASTROPARESIS: ICD-10-CM

## 2023-12-13 DIAGNOSIS — R53.1 WEAKNESS: ICD-10-CM

## 2023-12-13 DIAGNOSIS — C91.Z0 LARGE GRANULAR LYMPHOCYTE DISORDER: ICD-10-CM

## 2023-12-13 DIAGNOSIS — K44.9 PARAESOPHAGEAL HERNIA: ICD-10-CM

## 2023-12-13 LAB
B-HCG UR QL: NEGATIVE
EXPIRATION DATE: NORMAL
INTERNAL NEGATIVE CONTROL: NEGATIVE
INTERNAL POSITIVE CONTROL: POSITIVE
Lab: NORMAL

## 2023-12-13 PROCEDURE — 25010000002 METOCLOPRAMIDE PER 10 MG: Performed by: NURSE PRACTITIONER

## 2023-12-13 PROCEDURE — 25010000002 CLONIDINE PER 1 MG: Performed by: SURGERY

## 2023-12-13 PROCEDURE — 25010000002 CYANOCOBALAMIN PER 1000 MCG: Performed by: SURGERY

## 2023-12-13 PROCEDURE — 25010000002 ROPIVACAINE PER 1 MG: Performed by: SURGERY

## 2023-12-13 PROCEDURE — 25810000003 LACTATED RINGERS SOLUTION: Performed by: NURSE PRACTITIONER

## 2023-12-13 PROCEDURE — 25010000002 AMISULPRIDE (ANTIEMETIC) 5 MG/2ML SOLUTION: Performed by: SURGERY

## 2023-12-13 PROCEDURE — 25010000002 MAGNESIUM SULFATE PER 500 MG OF MAGNESIUM: Performed by: NURSE ANESTHETIST, CERTIFIED REGISTERED

## 2023-12-13 PROCEDURE — 25010000002 ENOXAPARIN PER 10 MG: Performed by: SURGERY

## 2023-12-13 PROCEDURE — 25010000002 PROPOFOL 200 MG/20ML EMULSION: Performed by: NURSE ANESTHETIST, CERTIFIED REGISTERED

## 2023-12-13 PROCEDURE — 25010000002 MIDAZOLAM PER 1 MG: Performed by: ANESTHESIOLOGY

## 2023-12-13 PROCEDURE — 25810000003 SODIUM CHLORIDE PER 500 ML: Performed by: SURGERY

## 2023-12-13 PROCEDURE — C9153 AMISULPRIDE (ANTIEMETIC) 5 MG/2ML SOLUTION: HCPCS | Performed by: SURGERY

## 2023-12-13 PROCEDURE — 25010000002 DEXAMETHASONE SODIUM PHOSPHATE 20 MG/5ML SOLUTION: Performed by: NURSE ANESTHETIST, CERTIFIED REGISTERED

## 2023-12-13 PROCEDURE — 25010000002 FENTANYL CITRATE (PF) 100 MCG/2ML SOLUTION: Performed by: NURSE ANESTHETIST, CERTIFIED REGISTERED

## 2023-12-13 PROCEDURE — 88307 TISSUE EXAM BY PATHOLOGIST: CPT | Performed by: SURGERY

## 2023-12-13 PROCEDURE — 25010000002 EPINEPHRINE 1 MG/ML SOLUTION 30 ML VIAL: Performed by: SURGERY

## 2023-12-13 PROCEDURE — 25010000002 ACETAMINOPHEN 10 MG/ML SOLUTION: Performed by: NURSE ANESTHETIST, CERTIFIED REGISTERED

## 2023-12-13 PROCEDURE — 25010000002 SUGAMMADEX 200 MG/2ML SOLUTION: Performed by: NURSE ANESTHETIST, CERTIFIED REGISTERED

## 2023-12-13 PROCEDURE — 43775 LAP SLEEVE GASTRECTOMY: CPT | Performed by: SURGERY

## 2023-12-13 PROCEDURE — 25010000002 ONDANSETRON PER 1 MG: Performed by: NURSE ANESTHETIST, CERTIFIED REGISTERED

## 2023-12-13 PROCEDURE — 25810000003 LACTATED RINGERS PER 1000 ML: Performed by: NURSE PRACTITIONER

## 2023-12-13 PROCEDURE — 25010000002 THIAMINE HCL 200 MG/2ML SOLUTION: Performed by: SURGERY

## 2023-12-13 PROCEDURE — 43775 LAP SLEEVE GASTRECTOMY: CPT | Performed by: NURSE PRACTITIONER

## 2023-12-13 PROCEDURE — 25010000002 PROPOFOL 10 MG/ML EMULSION: Performed by: NURSE ANESTHETIST, CERTIFIED REGISTERED

## 2023-12-13 PROCEDURE — 25010000002 KETOROLAC TROMETHAMINE PER 15 MG: Performed by: SURGERY

## 2023-12-13 PROCEDURE — 25010000002 CEFAZOLIN PER 500 MG: Performed by: NURSE PRACTITIONER

## 2023-12-13 PROCEDURE — 81025 URINE PREGNANCY TEST: CPT | Performed by: ANESTHESIOLOGY

## 2023-12-13 DEVICE — IMPLANTABLE DEVICE
Type: IMPLANTABLE DEVICE | Site: ABDOMEN | Status: FUNCTIONAL
Brand: TITAN SGS STANDARD GASTRIC STAPLER

## 2023-12-13 RX ORDER — FENTANYL CITRATE 50 UG/ML
50 INJECTION, SOLUTION INTRAMUSCULAR; INTRAVENOUS ONCE AS NEEDED
Status: DISCONTINUED | OUTPATIENT
Start: 2023-12-13 | End: 2023-12-13 | Stop reason: HOSPADM

## 2023-12-13 RX ORDER — PROCHLORPERAZINE EDISYLATE 5 MG/ML
10 INJECTION INTRAMUSCULAR; INTRAVENOUS AS NEEDED
Status: DISCONTINUED | OUTPATIENT
Start: 2023-12-13 | End: 2023-12-13 | Stop reason: HOSPADM

## 2023-12-13 RX ORDER — DIPHENHYDRAMINE HYDROCHLORIDE 50 MG/ML
12.5 INJECTION INTRAMUSCULAR; INTRAVENOUS
Status: DISCONTINUED | OUTPATIENT
Start: 2023-12-13 | End: 2023-12-13 | Stop reason: HOSPADM

## 2023-12-13 RX ORDER — DIPHENHYDRAMINE HYDROCHLORIDE 50 MG/ML
25 INJECTION INTRAMUSCULAR; INTRAVENOUS EVERY 4 HOURS PRN
Status: DISCONTINUED | OUTPATIENT
Start: 2023-12-13 | End: 2023-12-13 | Stop reason: HOSPADM

## 2023-12-13 RX ORDER — SODIUM CHLORIDE 0.9 % (FLUSH) 0.9 %
3-10 SYRINGE (ML) INJECTION AS NEEDED
Status: DISCONTINUED | OUTPATIENT
Start: 2023-12-13 | End: 2023-12-13 | Stop reason: HOSPADM

## 2023-12-13 RX ORDER — THIAMINE HYDROCHLORIDE 100 MG/ML
100 INJECTION, SOLUTION INTRAMUSCULAR; INTRAVENOUS ONCE
Status: COMPLETED | OUTPATIENT
Start: 2023-12-13 | End: 2023-12-13

## 2023-12-13 RX ORDER — SODIUM CHLORIDE 0.9 % (FLUSH) 0.9 %
3 SYRINGE (ML) INJECTION EVERY 12 HOURS SCHEDULED
Status: DISCONTINUED | OUTPATIENT
Start: 2023-12-13 | End: 2023-12-13 | Stop reason: HOSPADM

## 2023-12-13 RX ORDER — CHLORHEXIDINE GLUCONATE ORAL RINSE 1.2 MG/ML
15 SOLUTION DENTAL SEE ADMIN INSTRUCTIONS
Status: COMPLETED | OUTPATIENT
Start: 2023-12-13 | End: 2023-12-13

## 2023-12-13 RX ORDER — ONDANSETRON 2 MG/ML
INJECTION INTRAMUSCULAR; INTRAVENOUS AS NEEDED
Status: DISCONTINUED | OUTPATIENT
Start: 2023-12-13 | End: 2023-12-13 | Stop reason: SURG

## 2023-12-13 RX ORDER — PANTOPRAZOLE SODIUM 40 MG/10ML
40 INJECTION, POWDER, LYOPHILIZED, FOR SOLUTION INTRAVENOUS ONCE
Status: COMPLETED | OUTPATIENT
Start: 2023-12-13 | End: 2023-12-13

## 2023-12-13 RX ORDER — ENOXAPARIN SODIUM 100 MG/ML
40 INJECTION SUBCUTANEOUS ONCE
Status: COMPLETED | OUTPATIENT
Start: 2023-12-13 | End: 2023-12-13

## 2023-12-13 RX ORDER — MIDAZOLAM HYDROCHLORIDE 1 MG/ML
1 INJECTION INTRAMUSCULAR; INTRAVENOUS
Status: DISCONTINUED | OUTPATIENT
Start: 2023-12-13 | End: 2023-12-13 | Stop reason: HOSPADM

## 2023-12-13 RX ORDER — HYDRALAZINE HYDROCHLORIDE 20 MG/ML
5 INJECTION INTRAMUSCULAR; INTRAVENOUS
Status: DISCONTINUED | OUTPATIENT
Start: 2023-12-13 | End: 2023-12-13 | Stop reason: HOSPADM

## 2023-12-13 RX ORDER — FENTANYL CITRATE 50 UG/ML
50 INJECTION, SOLUTION INTRAMUSCULAR; INTRAVENOUS
Status: DISCONTINUED | OUTPATIENT
Start: 2023-12-13 | End: 2023-12-13 | Stop reason: HOSPADM

## 2023-12-13 RX ORDER — OXYCODONE AND ACETAMINOPHEN 7.5; 325 MG/1; MG/1
1 TABLET ORAL EVERY 4 HOURS PRN
Status: DISCONTINUED | OUTPATIENT
Start: 2023-12-13 | End: 2023-12-13 | Stop reason: HOSPADM

## 2023-12-13 RX ORDER — FENTANYL CITRATE 50 UG/ML
INJECTION, SOLUTION INTRAMUSCULAR; INTRAVENOUS AS NEEDED
Status: DISCONTINUED | OUTPATIENT
Start: 2023-12-13 | End: 2023-12-13 | Stop reason: SURG

## 2023-12-13 RX ORDER — PROMETHAZINE HYDROCHLORIDE 25 MG/1
12.5 TABLET ORAL ONCE
Status: DISCONTINUED | OUTPATIENT
Start: 2023-12-13 | End: 2023-12-13 | Stop reason: HOSPADM

## 2023-12-13 RX ORDER — ONDANSETRON 4 MG/1
4 TABLET, ORALLY DISINTEGRATING ORAL EVERY 4 HOURS PRN
Qty: 30 TABLET | Refills: 0 | Status: SHIPPED | OUTPATIENT
Start: 2023-12-13

## 2023-12-13 RX ORDER — CEFAZOLIN SODIUM IN 0.9 % NACL 3 G/100 ML
3 INTRAVENOUS SOLUTION, PIGGYBACK (ML) INTRAVENOUS ONCE
Status: COMPLETED | OUTPATIENT
Start: 2023-12-13 | End: 2023-12-13

## 2023-12-13 RX ORDER — PROPOFOL 10 MG/ML
INJECTION, EMULSION INTRAVENOUS AS NEEDED
Status: DISCONTINUED | OUTPATIENT
Start: 2023-12-13 | End: 2023-12-13 | Stop reason: SURG

## 2023-12-13 RX ORDER — LIDOCAINE HYDROCHLORIDE 10 MG/ML
0.5 INJECTION, SOLUTION INFILTRATION; PERINEURAL ONCE AS NEEDED
Status: DISCONTINUED | OUTPATIENT
Start: 2023-12-13 | End: 2023-12-13 | Stop reason: HOSPADM

## 2023-12-13 RX ORDER — SCOLOPAMINE TRANSDERMAL SYSTEM 1 MG/1
1 PATCH, EXTENDED RELEASE TRANSDERMAL CONTINUOUS
Status: DISCONTINUED | OUTPATIENT
Start: 2023-12-13 | End: 2023-12-13 | Stop reason: HOSPADM

## 2023-12-13 RX ORDER — IPRATROPIUM BROMIDE AND ALBUTEROL SULFATE 2.5; .5 MG/3ML; MG/3ML
3 SOLUTION RESPIRATORY (INHALATION) ONCE AS NEEDED
Status: DISCONTINUED | OUTPATIENT
Start: 2023-12-13 | End: 2023-12-13 | Stop reason: HOSPADM

## 2023-12-13 RX ORDER — HYDROCODONE BITARTRATE AND ACETAMINOPHEN 5; 325 MG/1; MG/1
1 TABLET ORAL ONCE AS NEEDED
Status: DISCONTINUED | OUTPATIENT
Start: 2023-12-13 | End: 2023-12-13 | Stop reason: HOSPADM

## 2023-12-13 RX ORDER — METOCLOPRAMIDE HYDROCHLORIDE 5 MG/ML
10 INJECTION INTRAMUSCULAR; INTRAVENOUS ONCE
Status: COMPLETED | OUTPATIENT
Start: 2023-12-13 | End: 2023-12-13

## 2023-12-13 RX ORDER — PROMETHAZINE HYDROCHLORIDE 25 MG/1
25 TABLET ORAL ONCE AS NEEDED
Status: DISCONTINUED | OUTPATIENT
Start: 2023-12-13 | End: 2023-12-13 | Stop reason: HOSPADM

## 2023-12-13 RX ORDER — TRAMADOL HYDROCHLORIDE 50 MG/1
50 TABLET ORAL EVERY 6 HOURS PRN
Qty: 12 TABLET | Refills: 0 | Status: SHIPPED | OUTPATIENT
Start: 2023-12-13 | End: 2023-12-21

## 2023-12-13 RX ORDER — ONDANSETRON 2 MG/ML
4 INJECTION INTRAMUSCULAR; INTRAVENOUS ONCE AS NEEDED
Status: DISCONTINUED | OUTPATIENT
Start: 2023-12-13 | End: 2023-12-13 | Stop reason: HOSPADM

## 2023-12-13 RX ORDER — DEXAMETHASONE SODIUM PHOSPHATE 4 MG/ML
INJECTION, SOLUTION INTRA-ARTICULAR; INTRALESIONAL; INTRAMUSCULAR; INTRAVENOUS; SOFT TISSUE AS NEEDED
Status: DISCONTINUED | OUTPATIENT
Start: 2023-12-13 | End: 2023-12-13 | Stop reason: SURG

## 2023-12-13 RX ORDER — SODIUM CHLORIDE 9 MG/ML
INJECTION, SOLUTION INTRAVENOUS AS NEEDED
Status: DISCONTINUED | OUTPATIENT
Start: 2023-12-13 | End: 2023-12-13 | Stop reason: HOSPADM

## 2023-12-13 RX ORDER — SODIUM CHLORIDE 9 MG/ML
40 INJECTION, SOLUTION INTRAVENOUS AS NEEDED
Status: DISCONTINUED | OUTPATIENT
Start: 2023-12-13 | End: 2023-12-13 | Stop reason: HOSPADM

## 2023-12-13 RX ORDER — DROPERIDOL 2.5 MG/ML
0.62 INJECTION, SOLUTION INTRAMUSCULAR; INTRAVENOUS
Status: DISCONTINUED | OUTPATIENT
Start: 2023-12-13 | End: 2023-12-13 | Stop reason: HOSPADM

## 2023-12-13 RX ORDER — LABETALOL HYDROCHLORIDE 5 MG/ML
5 INJECTION, SOLUTION INTRAVENOUS
Status: DISCONTINUED | OUTPATIENT
Start: 2023-12-13 | End: 2023-12-13 | Stop reason: HOSPADM

## 2023-12-13 RX ORDER — ACETAMINOPHEN 10 MG/ML
INJECTION, SOLUTION INTRAVENOUS AS NEEDED
Status: DISCONTINUED | OUTPATIENT
Start: 2023-12-13 | End: 2023-12-13 | Stop reason: SURG

## 2023-12-13 RX ORDER — SODIUM CHLORIDE, SODIUM LACTATE, POTASSIUM CHLORIDE, CALCIUM CHLORIDE 600; 310; 30; 20 MG/100ML; MG/100ML; MG/100ML; MG/100ML
9 INJECTION, SOLUTION INTRAVENOUS CONTINUOUS
Status: DISCONTINUED | OUTPATIENT
Start: 2023-12-13 | End: 2023-12-13 | Stop reason: HOSPADM

## 2023-12-13 RX ORDER — NALOXONE HCL 0.4 MG/ML
0.2 VIAL (ML) INJECTION AS NEEDED
Status: DISCONTINUED | OUTPATIENT
Start: 2023-12-13 | End: 2023-12-13 | Stop reason: HOSPADM

## 2023-12-13 RX ORDER — MAGNESIUM HYDROXIDE 1200 MG/15ML
LIQUID ORAL AS NEEDED
Status: DISCONTINUED | OUTPATIENT
Start: 2023-12-13 | End: 2023-12-13 | Stop reason: HOSPADM

## 2023-12-13 RX ORDER — PROMETHAZINE HYDROCHLORIDE 25 MG/1
25 SUPPOSITORY RECTAL ONCE AS NEEDED
Status: DISCONTINUED | OUTPATIENT
Start: 2023-12-13 | End: 2023-12-13 | Stop reason: HOSPADM

## 2023-12-13 RX ORDER — SODIUM CHLORIDE, SODIUM LACTATE, POTASSIUM CHLORIDE, CALCIUM CHLORIDE 600; 310; 30; 20 MG/100ML; MG/100ML; MG/100ML; MG/100ML
100 INJECTION, SOLUTION INTRAVENOUS CONTINUOUS
Status: DISCONTINUED | OUTPATIENT
Start: 2023-12-13 | End: 2023-12-13 | Stop reason: HOSPADM

## 2023-12-13 RX ORDER — MAGNESIUM SULFATE HEPTAHYDRATE 500 MG/ML
INJECTION, SOLUTION INTRAMUSCULAR; INTRAVENOUS AS NEEDED
Status: DISCONTINUED | OUTPATIENT
Start: 2023-12-13 | End: 2023-12-13 | Stop reason: SURG

## 2023-12-13 RX ORDER — FLUMAZENIL 0.1 MG/ML
0.2 INJECTION INTRAVENOUS AS NEEDED
Status: DISCONTINUED | OUTPATIENT
Start: 2023-12-13 | End: 2023-12-13 | Stop reason: HOSPADM

## 2023-12-13 RX ORDER — ROCURONIUM BROMIDE 10 MG/ML
INJECTION, SOLUTION INTRAVENOUS AS NEEDED
Status: DISCONTINUED | OUTPATIENT
Start: 2023-12-13 | End: 2023-12-13 | Stop reason: SURG

## 2023-12-13 RX ORDER — HYDROMORPHONE HYDROCHLORIDE 1 MG/ML
0.5 INJECTION, SOLUTION INTRAMUSCULAR; INTRAVENOUS; SUBCUTANEOUS
Status: DISCONTINUED | OUTPATIENT
Start: 2023-12-13 | End: 2023-12-13 | Stop reason: HOSPADM

## 2023-12-13 RX ORDER — GABAPENTIN 300 MG/1
300 CAPSULE ORAL ONCE
Status: COMPLETED | OUTPATIENT
Start: 2023-12-13 | End: 2023-12-13

## 2023-12-13 RX ORDER — EPHEDRINE SULFATE 50 MG/ML
5 INJECTION, SOLUTION INTRAVENOUS ONCE AS NEEDED
Status: DISCONTINUED | OUTPATIENT
Start: 2023-12-13 | End: 2023-12-13 | Stop reason: HOSPADM

## 2023-12-13 RX ORDER — PROMETHAZINE HYDROCHLORIDE 25 MG/1
25 SUPPOSITORY RECTAL ONCE
Status: DISCONTINUED | OUTPATIENT
Start: 2023-12-13 | End: 2023-12-13 | Stop reason: HOSPADM

## 2023-12-13 RX ORDER — KETAMINE HCL IN NACL, ISO-OSM 100MG/10ML
SYRINGE (ML) INJECTION AS NEEDED
Status: DISCONTINUED | OUTPATIENT
Start: 2023-12-13 | End: 2023-12-13 | Stop reason: SURG

## 2023-12-13 RX ORDER — LIDOCAINE HYDROCHLORIDE 20 MG/ML
INJECTION, SOLUTION EPIDURAL; INFILTRATION; INTRACAUDAL; PERINEURAL AS NEEDED
Status: DISCONTINUED | OUTPATIENT
Start: 2023-12-13 | End: 2023-12-13 | Stop reason: SURG

## 2023-12-13 RX ORDER — CYANOCOBALAMIN 1000 UG/ML
1000 INJECTION, SOLUTION INTRAMUSCULAR; SUBCUTANEOUS ONCE
Status: COMPLETED | OUTPATIENT
Start: 2023-12-13 | End: 2023-12-13

## 2023-12-13 RX ADMIN — CYANOCOBALAMIN 1000 MCG: 1000 INJECTION INTRAMUSCULAR; SUBCUTANEOUS at 10:15

## 2023-12-13 RX ADMIN — PANTOPRAZOLE SODIUM 40 MG: 40 INJECTION, POWDER, FOR SOLUTION INTRAVENOUS at 06:56

## 2023-12-13 RX ADMIN — MAGNESIUM SULFATE HEPTAHYDRATE 2 G: 500 INJECTION, SOLUTION INTRAMUSCULAR; INTRAVENOUS at 08:02

## 2023-12-13 RX ADMIN — FENTANYL CITRATE 25 MCG: 50 INJECTION, SOLUTION INTRAMUSCULAR; INTRAVENOUS at 08:55

## 2023-12-13 RX ADMIN — SUGAMMADEX 400 MG: 100 INJECTION, SOLUTION INTRAVENOUS at 08:45

## 2023-12-13 RX ADMIN — CEFAZOLIN 3 G: 10 INJECTION, POWDER, FOR SOLUTION INTRAVENOUS at 07:44

## 2023-12-13 RX ADMIN — THIAMINE HYDROCHLORIDE 100 MG: 100 INJECTION, SOLUTION INTRAMUSCULAR; INTRAVENOUS at 10:15

## 2023-12-13 RX ADMIN — MIDAZOLAM HYDROCHLORIDE 1 MG: 1 INJECTION, SOLUTION INTRAMUSCULAR; INTRAVENOUS at 06:56

## 2023-12-13 RX ADMIN — GABAPENTIN 300 MG: 300 CAPSULE ORAL at 06:25

## 2023-12-13 RX ADMIN — ONDANSETRON 4 MG: 2 INJECTION INTRAMUSCULAR; INTRAVENOUS at 08:03

## 2023-12-13 RX ADMIN — LIDOCAINE HYDROCHLORIDE 100 MG: 20 INJECTION, SOLUTION EPIDURAL; INFILTRATION; INTRACAUDAL; PERINEURAL at 07:54

## 2023-12-13 RX ADMIN — PROPOFOL 200 MG: 10 INJECTION, EMULSION INTRAVENOUS at 07:54

## 2023-12-13 RX ADMIN — SODIUM CHLORIDE, POTASSIUM CHLORIDE, SODIUM LACTATE AND CALCIUM CHLORIDE 100 ML/HR: 600; 310; 30; 20 INJECTION, SOLUTION INTRAVENOUS at 11:29

## 2023-12-13 RX ADMIN — 0.12% CHLORHEXIDINE GLUCONATE 15 ML: 1.2 RINSE ORAL at 06:25

## 2023-12-13 RX ADMIN — PROPOFOL 150 MCG/KG/MIN: 10 INJECTION, EMULSION INTRAVENOUS at 07:54

## 2023-12-13 RX ADMIN — METOCLOPRAMIDE 10 MG: 5 INJECTION, SOLUTION INTRAMUSCULAR; INTRAVENOUS at 06:56

## 2023-12-13 RX ADMIN — FENTANYL CITRATE 50 MCG: 50 INJECTION, SOLUTION INTRAMUSCULAR; INTRAVENOUS at 07:53

## 2023-12-13 RX ADMIN — SODIUM CHLORIDE, POTASSIUM CHLORIDE, SODIUM LACTATE AND CALCIUM CHLORIDE 500 ML: 600; 310; 30; 20 INJECTION, SOLUTION INTRAVENOUS at 06:40

## 2023-12-13 RX ADMIN — ENOXAPARIN SODIUM 40 MG: 100 INJECTION SUBCUTANEOUS at 13:36

## 2023-12-13 RX ADMIN — ACETAMINOPHEN 1000 MG: 10 INJECTION, SOLUTION INTRAVENOUS at 08:39

## 2023-12-13 RX ADMIN — AMISULPRIDE 10 MG: 2.5 INJECTION, SOLUTION INTRAVENOUS at 10:52

## 2023-12-13 RX ADMIN — SCOPALAMINE 1 PATCH: 1 PATCH, EXTENDED RELEASE TRANSDERMAL at 06:25

## 2023-12-13 RX ADMIN — ROCURONIUM BROMIDE 40 MG: 10 INJECTION, SOLUTION INTRAVENOUS at 07:54

## 2023-12-13 RX ADMIN — Medication 20 MG: at 07:59

## 2023-12-13 RX ADMIN — DEXAMETHASONE SODIUM PHOSPHATE 12 MG: 4 INJECTION, SOLUTION INTRAMUSCULAR; INTRAVENOUS at 07:59

## 2023-12-13 RX ADMIN — FENTANYL CITRATE 25 MCG: 50 INJECTION, SOLUTION INTRAMUSCULAR; INTRAVENOUS at 08:52

## 2023-12-13 NOTE — ANESTHESIA POSTPROCEDURE EVALUATION
Patient: Swetha HATFIELD    Procedure Summary       Date: 12/13/23 Room / Location:  [facility]  [facility]    Anesthesia Start: 0745 Anesthesia Stop: 0903    Procedure: GASTRIC SLEEVE LAPAROSCOPIC with paraesophageal Hernia Repair (Abdomen) Diagnosis:       Obesity, Class III, BMI 40-49.9 (morbid obesity)      (Obesity, Class III, BMI 40-49.9 (morbid obesity) [E66.01])    Surgeons: Eliel Acosta Jr., MD Provider: Luke Senior MD    Anesthesia Type: general ASA Status: 3            Anesthesia Type: general    Vitals  Vitals Value Taken Time   /64 12/13/23 1300   Temp 36.4 °C (97.5 °F) 12/13/23 0900   Pulse 80 12/13/23 1301   Resp 21 12/13/23 1130   SpO2 97 % 12/13/23 1301   Vitals shown include unfiled device data.        Post Anesthesia Care and Evaluation    Patient location during evaluation: bedside  Patient participation: complete - patient participated  Level of consciousness: awake and alert  Pain management: adequate    Airway patency: patent  Anesthetic complications: No anesthetic complications  PONV Status: controlled  Cardiovascular status: acceptable and hemodynamically stable  Respiratory status: acceptable, spontaneous ventilation and nonlabored ventilation  Hydration status: acceptable    Comments: /62 (BP Location: Right arm, Patient Position: Sitting)   Pulse 80   Temp 36.4 °C (97.5 °F) (Oral)   Resp 16   SpO2 100%

## 2023-12-13 NOTE — ANESTHESIA PREPROCEDURE EVALUATION
Anesthesia Evaluation     Patient summary reviewed   no history of anesthetic complications:   NPO Solid Status: > 8 hours  NPO Liquid Status: > 2 hours           Airway   Mallampati: II  TM distance: >3 FB  Neck ROM: full  No difficulty expected  Dental - normal exam     Pulmonary     breath sounds clear to auscultation  (+) ,sleep apnea  (-) shortness of breath, recent URI, not a smoker  Cardiovascular   Exercise tolerance: good (4-7 METS)    Rhythm: regular  Rate: normal    (-) past MI, dysrhythmias, angina      Neuro/Psych  (+) headaches (migraine hx), numbness (b/l sciatica), psychiatric history (OCD) Anxiety, Depression and ADHD  (-) seizures, CVA  GI/Hepatic/Renal/Endo    (+) morbid obesity, hiatal hernia, GERD, liver disease fatty liver disease  (-) no renal disease, diabetes    Musculoskeletal     (+) neck pain  (-) neck stiffness  Abdominal    Substance History      OB/GYN          Other   arthritis,   history of cancer (Large granular lymp dis)                      Anesthesia Plan    ASA 3     general     intravenous induction     Anesthetic plan, risks, benefits, and alternatives have been provided, discussed and informed consent has been obtained with: patient.    Use of blood products discussed with patient .        CODE STATUS:

## 2023-12-13 NOTE — ANESTHESIA PROCEDURE NOTES
Airway  Urgency: elective    Date/Time: 12/13/2023 7:58 AM  Airway not difficult    General Information and Staff    Patient location during procedure: OR  Anesthesiologist: Luke Senior MD  CRNA/CAA: Abeba Otero CRNA    Indications and Patient Condition  Indications for airway management: airway protection    Preoxygenated: yes  MILS not maintained throughout  Mask difficulty assessment: 1 - vent by mask    Final Airway Details  Final airway type: endotracheal airway      Successful airway: ETT  Cuffed: yes   Successful intubation technique: direct laryngoscopy  Facilitating devices/methods: intubating stylet  Endotracheal tube insertion site: oral  Blade: Mary  Blade size: 3  ETT size (mm): 7.0  Cormack-Lehane Classification: grade I - full view of glottis  Placement verified by: chest auscultation and capnometry   Measured from: lips  ETT/EBT  to lips (cm): 21  Number of attempts at approach: 1  Assessment: lips, teeth, and gum same as pre-op and atraumatic intubation

## 2023-12-13 NOTE — OP NOTE
PREOPERATIVE DIAGNOSIS:  Morbid obesity with multiple comorbidities as referenced in the most recent history and physical.    POSTOPERATIVE DIAGNOSIS:  Morbid obesity with multiple comorbidities as referenced in the most recent history and physical.  Paraesophageal hernia    PROCEDURES PERFORMED:  1.  Laparoscopic sleeve gastrectomy with Titan stapler # [id number]  2.  Laparoscopic paraesophageal hernia repair    SURGEON:  Eliel Acosta Jr., MD    ASSISTANT: ISIDRA Wayne      Surgery assisted and facilitated by a certified physician assistant, who directly resulted in a decreased operative time, anesthetic time, wound exposure, and possibly of an operative wound infection, thereby decreasing patient morbidity and ultimately total expenditures.  The surgical assistant assisted in placement of trochars, take down of the gastrocolic omentum, short gastric vessels and dissection at the angle of His.  Also assisted in retraction of the stomach during stapling so as not to kink the gastric sleeve.  Also assisted in removing of the gastric specimen, closure of the fascial defect as well as closure of the skin incisions.    ANESTHESIA:  General endotracheal and TAP block with Ropivacaine mixture    ESTIMATED BLOOD LOSS:   Less than 25 mL unless dictated below.    FLUIDS:  Crystalloids.    SPECIMENS:  Gastric remnant    DRAINS:  None.    COUNTS:  Correct.    COMPLICATIONS:  None.    INDICATIONS:  This patient with morbid obesity and associated comorbidities presents for elective laparoscopic, possible open sleeve gastrectomy.  The patient has received medical clearance to proceed.  The patient has undergone our extensive educational process and consent process and wishes to proceed.    DESCRIPTION OF PROCEDURE:  The patient was brought to the operating room and placed supine upon the operating room table. SCD hose were placed.  The patient underwent uneventful general endotracheal anesthesia per the anesthesiology  staff. The abdomen was prepped with ChloraPrep and draped in the usual sterile fashion.  An Ioban was used as well if not allergic.  Anesthesia staff then passed a 38-Fr balloon bougie into the stomach to decompress.  A 5-10 mm transverse incision was made a few centimeters above and to the left of the umbilicus and the peritoneal cavity entered under direct camera visualization using a 5 or 10 mm 0° laparoscope and an Optiview trocar.  The abdomen was then insufflated to a pressure of 15 mmHg with CO2 gas.  Exploratory laparoscopy revealed no evidence of injury from the entrance technique and no significant abnormalities unless addendum dictated below.  An angled laparoscope was then used.  The patient was placed in reverse Trendelenburg position.  Under direct camera visualization a 19 mm trocar was placed in the right lateral subcostal position.  A 5 mm trocar was placed in the right midabdominal position.  A 5 mm trocar was placed in the left midabdominal position. A Christel retractor was placed through an epigastric incision and used to elevate the left lobe of the liver.  The fat pad was elevated and the left narendra exposed.  At this point, approximately residential along the greater curvature, the gastrocolic omentum was divided with the Enseal and this proceeded superiorly to the angle of His taking down the short gastric vessels.  All posterior attachments of the lesser sac and posterior aspect of the stomach to the pancreas were taken down as well.  The left narendra was exposed along its length.  Dissection then proceeded medially taking down the greater curvature with an Enseal until just proximal to the pylorus.  The 38 Marshallese bougie was then directed distally into the stomach to the pylorus.  The balloon was inflated with 15 cc of saline.  The stomach was marked in the 3 positions using indelible ink.  The 3 markings were at the angle of Hiss, the incisura and antrum using 1cm, 3cm and 4-5cm respectively.  The  Titan stapler was then placed through the 19 mm trocar into the abdomen and opened up and the stomach pulled in to the markings on the stomach.  Careful attention was made to stay 1 cm from the esophagus.  Positive pressure retraction was then used to size the stomach perfectly.  The balloon on the bougie was then deflated and placed to suction prior to closing the stapler.  The stapler was then fired and then removed after completion.  Areas of the staple line were oversewn with absorbable sutures as needed for bleeding or questionable staple lines.  At this point, the gastrectomy specimen was withdrawn through the 12 mm trocar site incision. The specimen staple line was inspected and was intact.  The specimen was then sent off to pathology.  At this point, the sleeve was submerged under saline and using the bougie to insufflate the stomach, a leak test was performed.  This revealed the sleeve to be watertight, no air bubbles, no leak, and no bleeding seen from the staple lines and no significant abnormalities.  Irrigation fluid from the abdomen was then suctioned.  The gastric sleeve staple line was then treated with Tisseel fibrin glue. The fascia at the 19 mm trocar site incision was closed with a single 0 Vicryl suture in a figure-of-eight fashion placed under direct laparoscopic camera visualization with a suture passer and tying the knot extracorporeally.  The fascia in the area was infiltrated with local anesthesia. All incisions were then infiltrated with local anesthetic. The remaining trocars were removed under direct camera visualization with no bleeding noted from their sites.  The abdomen was desufflated of gas. The skin in each incision was closed using 4-0 antibiotic impregnated Monocryl in a subcuticular fashion followed by Dermabond.  The patient tolerated the procedure well without complication and was taken to the recovery room in stable condition.  All sponge, needle and instrument counts were  correct.     The patient was noted to have a paraesophageal hernia.  The phrenoesophageal membrane was opened up with electrocautery and the right and left crura were cleaned off using sharp and blunt dissection.  The peritoneum overlying the right narendra was incised and the plane along the hernia sac was developed.  The dissection then extended anteriorly and laterally to the left narendra.  The base of the crural confluence was dissected free of adhesions to the hernia sac.  The hernia sac was carefully dissected into the mediastinum with caudal traction.  The interfaces between the pleura, pericardium, spine, and aorta were developed as a dissection was carried cephalically to the top of the hernia sac.  Sac contents were completely reduced back into the abdominal cavity.  Careful attention was made not to injure the vagus nerve.  The esophagus was identified and dissected circumferentially and along its previous stomach course in order to reduce tension, allowing the gastroesophageal junction to rest comfortably within the abdominal cavity.  The gastrosplenic ligament and the short gastric vessels were divided with the Enseal device.  The retroesophageal window was developed and the esophagus was retracted caudally.  The crural pillars were then approximated with 0 Ethibond sutures.  Anterior reinforcement was performed as well if needed. The proximal sleeve and esophagus were then anchored to the left narendra with a 2-0 Vicryl suture

## 2023-12-14 LAB
LAB AP CASE REPORT: NORMAL
PATH REPORT.FINAL DX SPEC: NORMAL
PATH REPORT.GROSS SPEC: NORMAL

## 2023-12-21 ENCOUNTER — OFFICE VISIT (OUTPATIENT)
Dept: BARIATRICS/WEIGHT MGMT | Facility: CLINIC | Age: 38
End: 2023-12-21
Payer: COMMERCIAL

## 2023-12-21 VITALS
DIASTOLIC BLOOD PRESSURE: 84 MMHG | WEIGHT: 252 LBS | BODY MASS INDEX: 41.99 KG/M2 | SYSTOLIC BLOOD PRESSURE: 130 MMHG | HEART RATE: 80 BPM | TEMPERATURE: 97.7 F | HEIGHT: 65 IN

## 2023-12-21 DIAGNOSIS — Z98.84 S/P LAPAROSCOPIC SLEEVE GASTRECTOMY: ICD-10-CM

## 2023-12-21 DIAGNOSIS — Z71.3 DIETARY COUNSELING: ICD-10-CM

## 2023-12-21 DIAGNOSIS — E66.01 OBESITY, CLASS III, BMI 40-49.9 (MORBID OBESITY): Primary | ICD-10-CM

## 2023-12-21 PROBLEM — D03.9 MELANOMA IN SITU: Status: ACTIVE | Noted: 2023-07-31

## 2023-12-21 NOTE — PROGRESS NOTES
[facility] SURGERY  [address] [facility] [address]  [facility] [address]  [phone]  [address] [id number] [phone]  Dept: [phone]  12/21/2023      Swetha HATFIELD.  [id number]  [id number]  1985  female      Chief Complaint   Patient presents with    Post-op     8 days PO sleeve       BH Post-Op Bariatric Surgery:   Swetha HATFIELD is status post laparopscopic Laparoscopic Sleeve procedure with PEHR , performed on 12/13/2022.     HPI:   Today's weight is 114 kg (252 lb) pounds, today's BMI is Body mass index is 41.68 kg/m².,HE@ has a  loss of 15 pounds since surgery. The patient reports a decreased portion size and loss of appetite.  Swetha HATFIELD denies nausea, vomiting, reflux, dysphagia and reports tolerating diet. The patient c/o appropriate post-op incisional discomfort that is improving. she is doing well with protein and water intake so far. Taking their vitamins, walking and using IS. Denies fevers, chills, chest pain or shortness of air.   Tolerating full liquids.  Drinking 1 shake protein shake daily this week.  Last week was drinking 2 shakes. C/o constipation.  Drinking at least 64 oz of water.       Diet and Exercise: Diet history reviewed and discussed with the patient. Weight loss/gains to date discussed with the patient. No carbonated beverage consumption and exercising regularly- walking frequently.   Supplements: multivitamins, B-12, calcium, iron, B-1 and Vitamin D.   Patient is taking blood thinner as prescribed: lovenox  Current outpatient and discharge medications have been reconciled for the patient.  Reviewed by: ADITYA Toribio        Review of Systems   Constitutional:  Positive for activity change, appetite change and fatigue.   Respiratory:  Negative for shortness of breath.    Cardiovascular:  Negative for chest pain.   All other systems reviewed and are negative.      Patient Active Problem  List   Diagnosis    Splenomegaly    Social anxiety disorder    SAVANNAH (obstructive sleep apnea)    Multiple sclerosis    Migraine headache without aura    Large granular lymphocyte disorder    Leukocytosis    Immunocompromised    Hypersomnolence    Hearing loss in right ear    Eosinophilic esophagitis    ADD (attention deficit disorder)    Chronic fatigue    GERD (gastroesophageal reflux disease)    Paraesophageal hernia    Gastroparesis as a child    Dysmenorrhea    Chronic bilateral low back pain with sciatica    Fibromyalgia    Trigeminal neuralgia    OCD (obsessive compulsive disorder)    Vertigo    Weakness    Depression    Fatty liver    EDS (Esdras-Danlos syndrome)    Pancolonic diverticulosis    Obesity, Class III, BMI 40-49.9 (morbid obesity)    Melanoma in situ    S/P laparoscopic sleeve gastrectomy    Dietary counseling       The following portions of the patient's history were reviewed and updated as appropriate: allergies, current medications, past medical history, past surgical history, and problem list.    Vitals:    12/21/23 0843   BP: 130/84   Pulse: 80   Temp: 97.7 °F (36.5 °C)       Physical Exam  Vitals reviewed.   Constitutional:       General: She is awake. She is not in acute distress.     Appearance: She is morbidly obese.   HENT:      Head: Normocephalic and atraumatic.      Mouth/Throat:      Mouth: Mucous membranes are moist.   Eyes:      General: No scleral icterus.     Extraocular Movements: Extraocular movements intact.      Conjunctiva/sclera: Conjunctivae normal.      Pupils: Pupils are equal, round, and reactive to light.   Cardiovascular:      Rate and Rhythm: Normal rate and regular rhythm.   Pulmonary:      Effort: Pulmonary effort is normal. No respiratory distress.   Abdominal:      General: Abdomen is flat. Bowel sounds are normal. There is no distension.      Palpations: Abdomen is soft.      Tenderness: There is no abdominal tenderness. There is no guarding.      Comments:  Incisions clean, dry, intact; no erythema   Musculoskeletal:         General: Normal range of motion.      Cervical back: Normal range of motion and neck supple.   Skin:     General: Skin is warm and dry.   Neurological:      General: No focal deficit present.      Mental Status: She is alert and oriented to person, place, and time.   Psychiatric:         Mood and Affect: Mood normal.         Behavior: Behavior normal. Behavior is cooperative.         Thought Content: Thought content normal.         Judgment: Judgment normal.         Assessment:   Post-op, the patient is doing well.     Encounter Diagnoses   Name Primary?    Obesity, Class III, BMI 40-49.9 (morbid obesity) Yes    S/P laparoscopic sleeve gastrectomy     Dietary counseling        Plan:   Reviewed with patient the importance of following the manual for diet progression. Increase activity as tolerated. Continue increasing daily intake of protein and water.   Return to work: the patient is to return to 3 weeks from their surgery date with no restrictions unless they develop medical problems in which we will see them back in the office. They received a note in our office today with their return to work date.  Activity restrictions: no lifting, pushing or pulling over 25lbs for 3 weeks.   Recommended patient be sure to get at least 70 grams of protein per day. Discussed with the patient the recommended amount of water per day to intake. Reviewed vitamin requirements. Be sure to do routine exercise and increase activity as tolerated. No asa, nsaids or steroids for 8 weeks if gastric sleeve procedure and lifelong if gastric bypass procedure.. Patient may use miralax as needed if necessary.     Instructions / Recommendations: dietary counseling recommended, recommended a daily protein intake of  grams, vitamin supplement(s) recommended, recommended exercising at least 150 minutes per week, behavior modifications recommended and instructed to call the  office for concerns, questions, or problems.     The patient was instructed to follow up at one month follow up appt.     The patient was counseled regarding post op bariatric manual

## 2024-01-18 ENCOUNTER — OFFICE VISIT (OUTPATIENT)
Dept: BARIATRICS/WEIGHT MGMT | Facility: CLINIC | Age: 39
End: 2024-01-18
Payer: COMMERCIAL

## 2024-01-18 VITALS
HEIGHT: 65 IN | BODY MASS INDEX: 39.82 KG/M2 | DIASTOLIC BLOOD PRESSURE: 79 MMHG | WEIGHT: 239 LBS | HEART RATE: 76 BPM | SYSTOLIC BLOOD PRESSURE: 118 MMHG | TEMPERATURE: 97.6 F

## 2024-01-18 DIAGNOSIS — Z71.3 DIETARY COUNSELING: ICD-10-CM

## 2024-01-18 DIAGNOSIS — E66.9 OBESITY, CLASS II, BMI 35-39.9: Primary | ICD-10-CM

## 2024-01-18 DIAGNOSIS — G47.33 OSA (OBSTRUCTIVE SLEEP APNEA): ICD-10-CM

## 2024-01-18 DIAGNOSIS — Z98.84 S/P LAPAROSCOPIC SLEEVE GASTRECTOMY: ICD-10-CM

## 2024-01-18 DIAGNOSIS — K76.0 FATTY LIVER: ICD-10-CM

## 2024-01-18 PROBLEM — E66.812 OBESITY, CLASS II, BMI 35-39.9: Status: ACTIVE | Noted: 2024-01-18

## 2024-01-18 PROBLEM — E66.813 OBESITY, CLASS III, BMI 40-49.9 (MORBID OBESITY): Status: RESOLVED | Noted: 2023-11-21 | Resolved: 2024-01-18

## 2024-01-18 PROBLEM — K44.9 PARAESOPHAGEAL HERNIA: Status: RESOLVED | Noted: 2023-09-26 | Resolved: 2024-01-18

## 2024-01-18 PROBLEM — E66.01 OBESITY, CLASS III, BMI 40-49.9 (MORBID OBESITY): Status: RESOLVED | Noted: 2023-11-21 | Resolved: 2024-01-18

## 2024-01-18 PROCEDURE — 99024 POSTOP FOLLOW-UP VISIT: CPT | Performed by: NURSE PRACTITIONER

## 2024-01-18 NOTE — PROGRESS NOTES
[facility] BARIATRIC SURGERY  [address] [address]  [phone]  [address] [id number] [phone]  Dept: [phone]  1/18/2024      Swetha HATFIELD.  [id number]  [id number]  1985  female      Chief Complaint   Patient presents with    Post-op     5 week post op sleeve       BH Post-Op Bariatric Surgery:   Swetha HATFIELD is status post Laparoscopic gastric sleeve w/ PHR procedure, performed on 12/13/23     HPI:   Today's weight is 108 kg (239 lb) pounds, today's BMI is Body mass index is 39.53 kg/m²., a  loss of 13 pounds since the last visit and weight loss since surgery is 28 pounds.    Swetha HATFIELD denies n/v/d/regurg/reflux/pain and reports constipation (chronic) and fatigue. She did struggle with total loss of appetite during her menstrual cycle. Tolerating diet advancement without problems.     Diet and Exercise: Diet history reviewed and discussed with the patient. Weight loss/gains to date discussed with the patient. The patient states they are eating around 70+ grams of protein per day. She reports eating 3 meals per day, a typical portion size of <1 cup, eating 1 snacks per day, drinking 5+ or more 8-oz. glasses of water per day, no carbonated beverage consumption and exercising regularly- walking daily.     Supplements: bariatric advantage MVI, calcium.     Review of Systems   Constitutional:  Positive for fatigue.   Gastrointestinal:  Positive for constipation.   All other systems reviewed and are negative.        * No active hospital problems. *      Past Medical History:   Diagnosis Date    ADHD     Anxiety and depression     Arthritis     Diverticular disease     Dysmenorrhea     Dysphagia     Eczema     Fatigue     Fatty liver     Fibromyalgia     GERD (gastroesophageal reflux disease)     Hemorrhoids     Hiatal hernia     Large granular lymphocyte disorder     Migraine     Multiple sclerosis      receives IV infusions    Neck pain     Obesity, Class III, BMI 40-49.9 (morbid obesity)     OCD (obsessive compulsive disorder)     SAVANNAH (obstructive sleep apnea)     mild no machine    Paraesophageal hernia     Port-A-Cath in place     right side chest    Rosacea     Splenomegaly     Trigeminal neuralgia        The following portions of the patient's history were reviewed and updated as appropriate: allergies, current medications, past family history, past medical history, past social history, past surgical history, and problem list.    Vitals:    01/18/24 1109   BP: 118/79   Pulse: 76   Temp: 97.6 °F (36.4 °C)       Physical Exam  Vitals reviewed.   Constitutional:       Appearance: Normal appearance. She is well-developed. She is obese.   HENT:      Head: Normocephalic and atraumatic.   Cardiovascular:      Rate and Rhythm: Normal rate.   Pulmonary:      Effort: Pulmonary effort is normal.   Abdominal:      General: Bowel sounds are normal. There is no distension.      Palpations: Abdomen is soft.      Tenderness: There is no abdominal tenderness.   Musculoskeletal:         General: Normal range of motion.   Skin:     General: Skin is warm and dry.   Neurological:      Mental Status: She is alert and oriented to person, place, and time.   Psychiatric:         Behavior: Behavior normal.         Thought Content: Thought content normal.         Judgment: Judgment normal.         Assessment:   Post-op, the patient is doing well.     Encounter Diagnoses   Name Primary?    Obesity, Class II, BMI 35-39.9 Yes    S/P laparoscopic sleeve gastrectomy     Dietary counseling     Fatty liver     SAVANNAH (obstructive sleep apnea)        Plan:     Encouraged patient to be sure to get plenty of lean protein per day through small meals all with a protein source. Continue with diet advancement per the manual. Increase exercise as tolerated. Reviewed weight loss goals and time frame- 12 mo goal 205#    Activity restrictions: none.    Recommended patient be sure to get at least 70 grams of protein per day by eating small  meals all with high lean protein choices. Be sure to limit/cut back on daily carbohydrate intake. Discussed with the patient the recommended amount of water per day to intake- half of body weight in ounces. Reviewed vitamin requirements. Be sure to do routine exercise, 150 minutes per week minimum, including both cardio and strength training.     Instructions / Recommendations: dietary counseling recommended, recommended a daily protein intake of  grams, vitamin supplement(s) recommended, recommended exercising at least 150 minutes per week, behavior modifications recommended and instructed to call the office for concerns, questions, or problems.     The patient was instructed to follow up in 2 months.     The patient was counseled regarding the above procedure. The majority of time was spent counseling the patient regarding diet and exercise as well as reviewing their medications and their compliance with the procedure.

## 2024-02-18 ENCOUNTER — HOSPITAL ENCOUNTER (EMERGENCY)
Facility: HOSPITAL | Age: 39
Discharge: HOME | End: 2024-02-18
Attending: EMERGENCY MEDICINE | Admitting: EMERGENCY MEDICINE
Payer: COMMERCIAL

## 2024-02-18 ENCOUNTER — APPOINTMENT (OUTPATIENT)
Dept: CT IMAGING | Facility: HOSPITAL | Age: 39
End: 2024-02-18
Payer: COMMERCIAL

## 2024-02-18 VITALS
TEMPERATURE: 97.6 F | HEART RATE: 82 BPM | HEIGHT: 65 IN | WEIGHT: 227 LBS | RESPIRATION RATE: 18 BRPM | OXYGEN SATURATION: 99 % | SYSTOLIC BLOOD PRESSURE: 143 MMHG | DIASTOLIC BLOOD PRESSURE: 76 MMHG | BODY MASS INDEX: 37.82 KG/M2

## 2024-02-18 DIAGNOSIS — K57.92 DIVERTICULITIS: Primary | ICD-10-CM

## 2024-02-18 LAB
ALBUMIN SERPL-MCNC: 4.4 G/DL (ref 3.5–5.2)
ALBUMIN/GLOB SERPL: 1.7 G/DL
ALP SERPL-CCNC: 105 U/L (ref 39–117)
ALT SERPL W P-5'-P-CCNC: 20 U/L (ref 1–33)
ANION GAP SERPL CALCULATED.3IONS-SCNC: 13.6 MMOL/L (ref 5–15)
AST SERPL-CCNC: 20 U/L (ref 1–32)
BASOPHILS # BLD AUTO: 0.05 10*3/MM3 (ref 0–0.2)
BASOPHILS NFR BLD AUTO: 0.4 % (ref 0–1.5)
BILIRUB SERPL-MCNC: 0.4 MG/DL (ref 0–1.2)
BILIRUB UR QL STRIP: NEGATIVE
BUN SERPL-MCNC: 11 MG/DL (ref 6–20)
BUN/CREAT SERPL: 13.6 (ref 7–25)
CALCIUM SPEC-SCNC: 9.9 MG/DL (ref 8.6–10.5)
CHLORIDE SERPL-SCNC: 101 MMOL/L (ref 98–107)
CLARITY UR: CLEAR
CO2 SERPL-SCNC: 25.4 MMOL/L (ref 22–29)
COLOR UR: YELLOW
CREAT SERPL-MCNC: 0.81 MG/DL (ref 0.57–1)
DEPRECATED RDW RBC AUTO: 44.2 FL (ref 37–54)
EGFRCR SERPLBLD CKD-EPI 2021: 95.4 ML/MIN/1.73
EOSINOPHIL # BLD AUTO: 0.12 10*3/MM3 (ref 0–0.4)
EOSINOPHIL NFR BLD AUTO: 1.1 % (ref 0.3–6.2)
ERYTHROCYTE [DISTWIDTH] IN BLOOD BY AUTOMATED COUNT: 14.5 % (ref 12.3–15.4)
GLOBULIN UR ELPH-MCNC: 2.6 GM/DL
GLUCOSE SERPL-MCNC: 117 MG/DL (ref 65–99)
GLUCOSE UR STRIP-MCNC: NEGATIVE MG/DL
HCG SERPL QL: NEGATIVE
HCT VFR BLD AUTO: 41.8 % (ref 34–46.6)
HGB BLD-MCNC: 13.5 G/DL (ref 12–15.9)
HGB UR QL STRIP.AUTO: NEGATIVE
IMM GRANULOCYTES # BLD AUTO: 0.06 10*3/MM3 (ref 0–0.05)
IMM GRANULOCYTES NFR BLD AUTO: 0.5 % (ref 0–0.5)
KETONES UR QL STRIP: ABNORMAL
LEUKOCYTE ESTERASE UR QL STRIP.AUTO: NEGATIVE
LIPASE SERPL-CCNC: 36 U/L (ref 13–60)
LYMPHOCYTES # BLD AUTO: 2.89 10*3/MM3 (ref 0.7–3.1)
LYMPHOCYTES NFR BLD AUTO: 25.8 % (ref 19.6–45.3)
MCH RBC QN AUTO: 27.2 PG (ref 26.6–33)
MCHC RBC AUTO-ENTMCNC: 32.3 G/DL (ref 31.5–35.7)
MCV RBC AUTO: 84.3 FL (ref 79–97)
MONOCYTES # BLD AUTO: 0.62 10*3/MM3 (ref 0.1–0.9)
MONOCYTES NFR BLD AUTO: 5.5 % (ref 5–12)
NEUTROPHILS NFR BLD AUTO: 66.7 % (ref 42.7–76)
NEUTROPHILS NFR BLD AUTO: 7.45 10*3/MM3 (ref 1.7–7)
NITRITE UR QL STRIP: NEGATIVE
NRBC BLD AUTO-RTO: 0.4 /100 WBC (ref 0–0.2)
PH UR STRIP.AUTO: 6.5 [PH] (ref 5–8)
PLATELET # BLD AUTO: 349 10*3/MM3 (ref 140–450)
PMV BLD AUTO: 11.4 FL (ref 6–12)
POTASSIUM SERPL-SCNC: 3.8 MMOL/L (ref 3.5–5.2)
PROT SERPL-MCNC: 7 G/DL (ref 6–8.5)
PROT UR QL STRIP: NEGATIVE
RBC # BLD AUTO: 4.96 10*6/MM3 (ref 3.77–5.28)
SODIUM SERPL-SCNC: 140 MMOL/L (ref 136–145)
SP GR UR STRIP: 1.01 (ref 1–1.03)
UROBILINOGEN UR QL STRIP: ABNORMAL
WBC NRBC COR # BLD AUTO: 11.19 10*3/MM3 (ref 3.4–10.8)

## 2024-02-18 PROCEDURE — 74176 CT ABD & PELVIS W/O CONTRAST: CPT

## 2024-02-18 PROCEDURE — 25810000003 SODIUM CHLORIDE 0.9 % SOLUTION: Performed by: EMERGENCY MEDICINE

## 2024-02-18 PROCEDURE — 99284 EMERGENCY DEPT VISIT MOD MDM: CPT

## 2024-02-18 PROCEDURE — 25010000002 PIPERACILLIN SOD-TAZOBACTAM PER 1 G: Performed by: EMERGENCY MEDICINE

## 2024-02-18 PROCEDURE — 83690 ASSAY OF LIPASE: CPT | Performed by: EMERGENCY MEDICINE

## 2024-02-18 PROCEDURE — 81003 URINALYSIS AUTO W/O SCOPE: CPT | Performed by: EMERGENCY MEDICINE

## 2024-02-18 PROCEDURE — 25010000002 KETOROLAC TROMETHAMINE PER 15 MG: Performed by: EMERGENCY MEDICINE

## 2024-02-18 PROCEDURE — 96375 TX/PRO/DX INJ NEW DRUG ADDON: CPT

## 2024-02-18 PROCEDURE — 84703 CHORIONIC GONADOTROPIN ASSAY: CPT | Performed by: EMERGENCY MEDICINE

## 2024-02-18 PROCEDURE — 85025 COMPLETE CBC W/AUTO DIFF WBC: CPT | Performed by: EMERGENCY MEDICINE

## 2024-02-18 PROCEDURE — 80053 COMPREHEN METABOLIC PANEL: CPT | Performed by: EMERGENCY MEDICINE

## 2024-02-18 PROCEDURE — 96365 THER/PROPH/DIAG IV INF INIT: CPT

## 2024-02-18 RX ORDER — ONDANSETRON 4 MG/1
4 TABLET, FILM COATED ORAL EVERY 6 HOURS PRN
Qty: 10 TABLET | Refills: 0 | Status: SHIPPED | OUTPATIENT
Start: 2024-02-18

## 2024-02-18 RX ORDER — AMOXICILLIN AND CLAVULANATE POTASSIUM 875; 125 MG/1; MG/1
1 TABLET, FILM COATED ORAL EVERY 12 HOURS
Qty: 14 TABLET | Refills: 0 | Status: SHIPPED | OUTPATIENT
Start: 2024-02-18

## 2024-02-18 RX ORDER — KETOROLAC TROMETHAMINE 15 MG/ML
15 INJECTION, SOLUTION INTRAMUSCULAR; INTRAVENOUS ONCE
Status: COMPLETED | OUTPATIENT
Start: 2024-02-18 | End: 2024-02-18

## 2024-02-18 RX ADMIN — PIPERACILLIN SODIUM AND TAZOBACTAM SODIUM 3.38 G: 3; .375 INJECTION, SOLUTION INTRAVENOUS at 02:29

## 2024-02-18 RX ADMIN — SODIUM CHLORIDE 1000 ML: 9 INJECTION, SOLUTION INTRAVENOUS at 00:34

## 2024-02-18 RX ADMIN — KETOROLAC TROMETHAMINE 15 MG: 15 INJECTION, SOLUTION INTRAMUSCULAR; INTRAVENOUS at 00:34

## 2024-02-18 NOTE — DISCHARGE INSTRUCTIONS
Go home and rest for 24 hours.  Clear liquid diet for 24 hours.  Take antibiotics and nausea medicine as needed.  Follow-up with your doctor if not better in 2 days or return if worse

## 2024-02-18 NOTE — ED PROVIDER NOTES
EMERGENCY DEPARTMENT ENCOUNTER    Room Number:  21/21  Date seen:  2/18/2024  PCP: Kevin Salmeron MD  Historian: Patient      HPI:  Chief Complaint: Left lower quadrant pain  Context: Swetha Jones is a 39 y.o. female who presents to the ED c/o left lower quadrant abdominal pain that began this morning has become progressively worse.  She reports nausea but no vomiting or diarrhea.  She denies fever or chills.  She states this reminds her of her prior diverticulitis episodes.  She denies dysuria or hematuria.  She denies flank pain.      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    Leukocytosis 12/11/2020    Immunocompromised 06/18/2020    Hypersomnolence 08/25/2016    Hearing loss in right ear 10/07/2012    Eosinophilic esophagitis 07/19/2023    ADD (attention deficit disorder) 09/26/2023    Chronic fatigue 09/26/2023    GERD (gastroesophageal reflux disease) 09/26/2023    Gastroparesis as a child 09/26/2023    Dysmenorrhea 09/26/2023    Chronic bilateral low back pain with sciatica 09/26/2023    Fibromyalgia 09/26/2023    Trigeminal neuralgia 09/26/2023    OCD (obsessive compulsive disorder) 09/26/2023    Vertigo 09/26/2023    Weakness 09/26/2023    Depression 09/26/2023    Fatty liver 09/27/2023    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    Pancolonic diverticulosis 09/27/2023    Melanoma in situ 07/31/2023    S/P laparoscopic sleeve gastrectomy 12/21/2023    Dietary counseling 12/21/2023    Obesity, Class II, BMI 35-39.9 01/18/2024     Resolved Ambulatory Problems     Diagnosis Date Noted    Mood disorder 03/06/2013    Diverticulitis of large intestine with perforation without bleeding 06/17/2020    Acute left-sided low back pain with  left-sided sciatica 09/08/2022    Paraesophageal hernia 09/26/2023    Dysphagia 09/26/2023    Diverticulosis 09/26/2023    ANGULO (nonalcoholic steatohepatitis) 09/26/2023    Morbid obesity with body mass index (BMI) of 40.0 to 44.9 in adult 09/27/2023    Obesity, Class III, BMI 40-49.9 (morbid obesity) 11/21/2023     Past Medical History:   Diagnosis Date    ADHD     Anxiety and depression     Arthritis     Diverticular disease     Eczema     Fatigue     Hemorrhoids     Hiatal hernia     Migraine     Neck pain     Port-A-Cath in place     Rosacea          REVIEW OF SYSTEMS  All systems reviewed and negative except for those discussed in HPI.       PAST SURGICAL HISTORY  Past Surgical History:   Procedure Laterality Date    BONE MARROW ASPIRATE AND BIOPSY WIITH LUMBAR PUNCTURE  2020    COLONOSCOPY  2020    COLPOSCOPY W/ BIOPSY / CURETTAGE  2013    ENDOSCOPY  08/10/2023    dx eosinophilic esophagitis    GASTRIC SLEEVE LAPAROSCOPIC N/A 12/13/2023    Procedure: GASTRIC SLEEVE LAPAROSCOPIC with paraesophageal Hernia Repair;  Surgeon: Eliel Acosta Jr., MD;  Location: [facility];  Service: Bariatric;  Laterality: N/A;    MOHS SURGERY      melanoma right shoulder    PORTACATH PLACEMENT  2016    TYMPANOSTOMY Bilateral     1988,1991,1994         FAMILY HISTORY  Family History   Problem Relation Age of Onset    Sleep apnea Mother     Ovarian cancer Mother     OCD Mother     Hepatitis Mother     Obesity Father     Sleep apnea Father     Diabetes Father     Heart disease Father     Hepatitis Father     Obesity Sister     Bipolar disorder Sister     Heart disease Brother     Sleep apnea Brother     Diabetes Brother     Obesity Brother     Hypertension Brother     Heart attack Brother 37    Sleep apnea Maternal Grandmother     Heart disease Maternal Grandmother     Heart attack Maternal Grandmother     Hypertension Maternal Grandmother     Diabetes Maternal Grandmother     Throat cancer Maternal Grandfather      Hypertension Paternal Grandmother     Hypertension Paternal Grandfather     Lung cancer Paternal Grandfather     Malig Hyperthermia Neg Hx          SOCIAL HISTORY  Social History     Socioeconomic History    Marital status:    Tobacco Use    Smoking status: Never    Smokeless tobacco: Never   Vaping Use    Vaping Use: Never used   Substance and Sexual Activity    Alcohol use: Never    Drug use: Never    Sexual activity: Yes     Partners: Male     Birth control/protection: Condom         ALLERGIES  Shellfish allergy, Latex, and Soybean-containing drug products      PHYSICAL EXAM  ED Triage Vitals [02/18/24 0004]   Temp Heart Rate Resp BP SpO2   97.6 °F (36.4 °C) (!) 130 18 -- 98 %      Temp src Heart Rate Source Patient Position BP Location FiO2 (%)   Tympanic Monitor -- -- --       Physical Exam      GENERAL: 39-year-old female in no acute distress  HENT: NCAT: nares patent: Neck supple  EYES: no scleral icterus  ABDOMEN: soft, left lower quadrant tenderness with guarding but no rebound and diminished bowel sounds  MUSCULOSKELETAL: no deformity  NEURO: alert with nonfocal neuro exam  PSYCH:  calm, cooperative  SKIN: warm, dry    Vital signs and nursing notes reviewed.      LAB RESULTS  Recent Results (from the past 24 hour(s))   Comprehensive Metabolic Panel    Collection Time: 02/18/24 12:25 AM    Specimen: Blood   Result Value Ref Range    Glucose 117 (H) 65 - 99 mg/dL    BUN 11 6 - 20 mg/dL    Creatinine 0.81 0.57 - 1.00 mg/dL    Sodium 140 136 - 145 mmol/L    Potassium 3.8 3.5 - 5.2 mmol/L    Chloride 101 98 - 107 mmol/L    CO2 25.4 22.0 - 29.0 mmol/L    Calcium 9.9 8.6 - 10.5 mg/dL    Total Protein 7.0 6.0 - 8.5 g/dL    Albumin 4.4 3.5 - 5.2 g/dL    ALT (SGPT) 20 1 - 33 U/L    AST (SGOT) 20 1 - 32 U/L    Alkaline Phosphatase 105 39 - 117 U/L    Total Bilirubin 0.4 0.0 - 1.2 mg/dL    Globulin 2.6 gm/dL    A/G Ratio 1.7 g/dL    BUN/Creatinine Ratio 13.6 7.0 - 25.0    Anion Gap 13.6 5.0 - 15.0 mmol/L     eGFR 95.4 >60.0 mL/min/1.73   Lipase    Collection Time: 02/18/24 12:25 AM    Specimen: Blood   Result Value Ref Range    Lipase 36 13 - 60 U/L   Urinalysis With Microscopic If Indicated (No Culture) - Urine, Clean Catch    Collection Time: 02/18/24 12:25 AM    Specimen: Urine, Clean Catch   Result Value Ref Range    Color, UA Yellow Yellow, Straw    Appearance, UA Clear Clear    pH, UA 6.5 5.0 - 8.0    Specific Gravity, UA 1.006 1.005 - 1.030    Glucose, UA Negative Negative    Ketones, UA Trace (A) Negative    Bilirubin, UA Negative Negative    Blood, UA Negative Negative    Protein, UA Negative Negative    Leuk Esterase, UA Negative Negative    Nitrite, UA Negative Negative    Urobilinogen, UA 0.2 E.U./dL 0.2 - 1.0 E.U./dL   hCG, Serum, Qualitative    Collection Time: 02/18/24 12:25 AM    Specimen: Blood   Result Value Ref Range    HCG Qualitative Negative Negative   CBC Auto Differential    Collection Time: 02/18/24 12:25 AM    Specimen: Blood   Result Value Ref Range    WBC 11.19 (H) 3.40 - 10.80 10*3/mm3    RBC 4.96 3.77 - 5.28 10*6/mm3    Hemoglobin 13.5 12.0 - 15.9 g/dL    Hematocrit 41.8 34.0 - 46.6 %    MCV 84.3 79.0 - 97.0 fL    MCH 27.2 26.6 - 33.0 pg    MCHC 32.3 31.5 - 35.7 g/dL    RDW 14.5 12.3 - 15.4 %    RDW-SD 44.2 37.0 - 54.0 fl    MPV 11.4 6.0 - 12.0 fL    Platelets 349 140 - 450 10*3/mm3    Neutrophil % 66.7 42.7 - 76.0 %    Lymphocyte % 25.8 19.6 - 45.3 %    Monocyte % 5.5 5.0 - 12.0 %    Eosinophil % 1.1 0.3 - 6.2 %    Basophil % 0.4 0.0 - 1.5 %    Immature Grans % 0.5 0.0 - 0.5 %    Neutrophils, Absolute 7.45 (H) 1.70 - 7.00 10*3/mm3    Lymphocytes, Absolute 2.89 0.70 - 3.10 10*3/mm3    Monocytes, Absolute 0.62 0.10 - 0.90 10*3/mm3    Eosinophils, Absolute 0.12 0.00 - 0.40 10*3/mm3    Basophils, Absolute 0.05 0.00 - 0.20 10*3/mm3    Immature Grans, Absolute 0.06 (H) 0.00 - 0.05 10*3/mm3    nRBC 0.4 (H) 0.0 - 0.2 /100 WBC       Ordered the above labs and reviewed the  results.        RADIOLOGY  CT Abdomen Pelvis Without Contrast    Result Date: 2/18/2024  CT OF THE ABDOMEN PELVIS WITHOUT CONTRAST  HISTORY: Left lower quadrant pain  COMPARISON: None available.  TECHNIQUE: Axial CT imaging was obtained through the abdomen and pelvis. No IV contrast was administered.  FINDINGS: Images through the lung bases demonstrate a subpleural nodule within the left lower lobe. This measures up to 5 mm. Follow-up CT in 6 to 12 months is suggested, if the patient has a history of smoking or prior malignancy. No suspicious hepatic lesions are seen. Spleen is enlarged, measuring 14.8 cm in AP dimensions. There is a small hiatal hernia. There are changes of prior gastric sleeve procedure. The gallbladder, adrenal glands, pancreas, and duodenum are normal. No hydronephrosis is seen on either side. No distal ureteral or bladder stones are seen. Uterus is normal. Ovaries appear unremarkable. There is colonic diverticulosis. There is inflammatory stranding seen surrounding the distal descending colon and proximal sigmoid colon. This may represent diverticulitis, although given the length of segment involved, colitis would be another consideration. There is no evidence of obstruction. No extraluminal gas or fluid collection is seen to suggest perforation with abscess formation. The appendix is normal. No acute osseous abnormalities are seen.       The patient has colonic diverticulosis, and there is inflammatory stranding seen surrounding the distal descending colon and proximal sigmoid colon, favored to reflect diverticulitis. Given the length of segment involved, colitis would be another consideration.        Radiation dose reduction techniques were utilized, including automated exposure control and exposure modulation based on body size.   This report was finalized on 2/18/2024 2:09 AM by Dr. Silvana Brown M.D on Workstation: [id number]       Ordered the above noted radiological studies.  Reviewed by me in PACS.            PROCEDURES  Procedures          MEDICATIONS GIVEN IN ER  Medications   sodium chloride 0.9 % bolus 1,000 mL (1,000 mL Intravenous New Bag 2/18/24 0034)   ketorolac (TORADOL) injection 15 mg (15 mg Intravenous Given 2/18/24 0034)   piperacillin-tazobactam (ZOSYN) 3.375 g in iso-osmotic dextrose 50 ml (premix) (3.375 g Intravenous New Bag 2/18/24 0229)             MEDICAL DECISION MAKING, PROGRESS, and CONSULTS    All labs have been independently reviewed by me.  All radiology studies have been reviewed by me and I have also reviewed the radiology report.   EKG's independently viewed and interpreted by me.  Discussion below represents my analysis of pertinent findings related to patient's condition, differential diagnosis, treatment plan and final disposition.      Additional sources:    - External (non-ED) record review: I reviewed the patient's ER visit [facility] in May 2022 where she was seen for diverticulitis.    - Chronic or social conditions impacting care: Patient lives at home with family    - Shared decision making: After shared decision-making discussion with myself and the patient where she was offered admission to the hospital versus discharge on antibiotics, the patient opted for discharge with oral antibiotics but will return if worse      Orders placed during this visit:  Orders Placed This Encounter   Procedures    CT Abdomen Pelvis Without Contrast    Comprehensive Metabolic Panel    Lipase    Urinalysis With Microscopic If Indicated (No Culture) - Urine, Clean Catch    hCG, Serum, Qualitative    CBC Auto Differential    CBC & Differential         Differential diagnosis:  My differential diagnosis includes but is not limited to gastritis, pancreatitis, cholecystitis, appendicitis, diverticulitis, urinary tract infection, kidney stone, or bowel obstruction.        Independent interpretation of labs, radiology studies, and discussions with consultants:  ED Course as of  02/18/24 0236   Sun Feb 18, 2024   0024 I will treat the patient with IV fluids, Toradol while obtaining labs, urinalysis and CT scan for further evaluation [GP]   0212 The patient's white count is 11 but otherwise her lab work is unremarkable.  The patient CT scan was read as mild diverticulitis without perforation or abscess. [GP]   0218 On repeat examination the patient is resting comfortably.  She currently has no abdominal pain and nausea.  I discussed admission to the hospital for IV antibiotics and further care versus discharge on oral antibiotics.  The patient states she would like to go home or return if worse.  I will give her her first dose of Zosyn here in the IV and then discharged home on Augmentin twice daily for 7 days.  The patient understands and agrees with the plan. [GP]      ED Course User Index  [GP] Eron Tim MD               DIAGNOSIS  Final diagnoses:   Diverticulitis         DISPOSITION  DISCHARGE    Patient discharged in stable condition.    Reviewed implications of results, diagnosis, meds, responsibility to follow up, warning signs and symptoms of possible worsening, potential complications and reasons to return to ER, including worsening pain, fever or intractable vomiting.    Patient/Family voiced understanding of above instructions.    Discussed plan for discharge, as there is no emergent indication for admission.  Pt/family is agreeable and understands need for follow up and repeat testing.  Pt is aware that discharge does not mean that nothing is wrong but it indicates no emergency is present and they must continue care with follow-up as given below or physician of their choice.     FOLLOW-UP  Kevin Salmeron MD  [address] [facility] [address] [phone]    In 2 days  If symptoms worsen              --------------------  Please note that portions of this were completed with a voice recognition program.       Note Disclaimer: At [facility], we believe that  sharing information builds trust and better relationships. You are receiving this note because you are receiving care at [facility] or recently visited. It is possible you will see health information before a provider has talked with you about it. This kind of information can be easy to misunderstand. To help you fully understand what it means for your health, we urge you to discuss this note with your provider.             Eron Tim MD  02/18/24 0237

## 2024-02-18 NOTE — ED TRIAGE NOTES
Pt presents to ED with complaints of lower left quadrant abdominal pain since earlier today-pt states she has a history of diverticulitis and believes this is the same.

## 2024-03-14 ENCOUNTER — OFFICE VISIT (OUTPATIENT)
Dept: BARIATRICS/WEIGHT MGMT | Facility: CLINIC | Age: 39
End: 2024-03-14
Payer: COMMERCIAL

## 2024-03-14 VITALS
BODY MASS INDEX: 36.39 KG/M2 | DIASTOLIC BLOOD PRESSURE: 73 MMHG | SYSTOLIC BLOOD PRESSURE: 122 MMHG | WEIGHT: 218.4 LBS | HEIGHT: 65 IN | TEMPERATURE: 97.8 F | HEART RATE: 79 BPM

## 2024-03-14 DIAGNOSIS — Z98.84 S/P LAPAROSCOPIC SLEEVE GASTRECTOMY: ICD-10-CM

## 2024-03-14 DIAGNOSIS — E66.9 OBESITY, CLASS II, BMI 35-39.9: Primary | ICD-10-CM

## 2024-03-14 DIAGNOSIS — Z71.3 DIETARY COUNSELING: ICD-10-CM

## 2024-03-14 PROCEDURE — 99214 OFFICE O/P EST MOD 30 MIN: CPT | Performed by: NURSE PRACTITIONER

## 2024-03-14 NOTE — PROGRESS NOTES
[facility]  [address]  [facility] [id number] [phone]  [address]  [facility] [id number] [phone]  Dept: [phone]  3/14/2024      Swetha Jones.  [id number]  [id number]  1985  female      Chief Complaint   Patient presents with    Follow-up     3 month follow up sleeve       BH Post-Op Bariatric Surgery:   Swetha Jones is status post Laparoscopic gastric sleeve w/ PHR procedure, performed on 12/13/23     HPI:   Today's weight is 99.1 kg (218 lb 6.4 oz) pounds, today's BMI is Body mass index is 36.12 kg/m²., a  loss of 21 pounds since the last visit and weight loss since surgery is 49 pounds.    Swetha Jones denies n/v/d/regurg/reflux/pain and reports constipation, fatigue, alopecia.     Diet and Exercise: Diet history reviewed and discussed with the patient. Weight loss/gains to date discussed with the patient. The patient states they are eating around 70+ grams of protein per day. She reports eating 2 meals per day, a typical portion size of 1 cup, eating 2 snacks per day, drinking 5+ or more 8-oz. glasses of water per day, no carbonated beverage consumption and exercising regularly- walking/resistance band 5 times a week.     Supplements: bariatric MVI, calcium.     Review of Systems   Constitutional:  Positive for fatigue.        Alopecia   Gastrointestinal:  Positive for constipation.   All other systems reviewed and are negative.        * No active hospital problems. *      Past Medical History:   Diagnosis Date    ADHD     Anxiety and depression     Arthritis     Diverticular disease     Dysmenorrhea     Dysphagia     Eczema     Fatigue     Fatty liver     Fibromyalgia     GERD (gastroesophageal reflux disease)     Hemorrhoids     Hiatal hernia     Large granular lymphocyte disorder     Migraine     Multiple sclerosis     receives IV infusions    Neck pain     Obesity, Class III, BMI 40-49.9  (morbid obesity)     OCD (obsessive compulsive disorder)     SAVANNAH (obstructive sleep apnea)     mild no machine    Paraesophageal hernia     Port-A-Cath in place     right side chest    Rosacea     Splenomegaly     Trigeminal neuralgia        The following portions of the patient's history were reviewed and updated as appropriate: allergies, current medications, past family history, past medical history, past social history, past surgical history, and problem list.    Vitals:    03/14/24 1346   BP: 122/73   Pulse: 79   Temp: 97.8 °F (36.6 °C)       Physical Exam  Vitals reviewed.   Constitutional:       Appearance: Normal appearance. She is well-developed. She is obese.   HENT:      Head: Normocephalic and atraumatic.   Cardiovascular:      Rate and Rhythm: Normal rate.   Pulmonary:      Effort: Pulmonary effort is normal.      Breath sounds: Normal breath sounds.   Abdominal:      General: Bowel sounds are normal. There is no distension.      Palpations: Abdomen is soft.      Tenderness: There is no abdominal tenderness.   Musculoskeletal:         General: Normal range of motion.   Skin:     General: Skin is warm and dry.   Neurological:      Mental Status: She is alert and oriented to person, place, and time.   Psychiatric:         Behavior: Behavior normal.         Thought Content: Thought content normal.         Judgment: Judgment normal.         Assessment:   Post-op, the patient is doing well.     Encounter Diagnoses   Name Primary?    Obesity, Class II, BMI 35-39.9 Yes    S/P laparoscopic sleeve gastrectomy     Dietary counseling        Plan:     Encouraged patient to be sure to get plenty of lean protein per day through small meals all with a protein source. Discussed fiber supplementation, water intake, dietary changes to help with constipation. Will follow up with GI regarding possibly changing linzess. Reviewed labs on her phone from her [facility] [place] and they are all within range.  Activity restrictions:  none.   Recommended patient be sure to get at least 70 grams of protein per day by eating small  meals all with high lean protein choices. Be sure to limit/cut back on daily carbohydrate intake. Discussed with the patient the recommended amount of water per day to intake- half of body weight in ounces. Reviewed vitamin requirements. Be sure to do routine exercise, 150 minutes per week minimum, including both cardio and strength training.     Instructions / Recommendations: dietary counseling recommended, recommended a daily protein intake of  grams, vitamin supplement(s) recommended, recommended exercising at least 150 minutes per week, behavior modifications recommended and instructed to call the office for concerns, questions, or problems.     The patient was instructed to follow up in 3 months.     The patient was counseled regarding the above procedure. Total time spent on this encounter was 30 minutes including reviewing previous notes, lab results and face to face time spent with the patient.  The majority of time was spent counseling the patient regarding diet and exercise as well as reviewing their medications and their compliance with the procedure.

## 2024-06-14 ENCOUNTER — OFFICE VISIT (OUTPATIENT)
Dept: BARIATRICS/WEIGHT MGMT | Facility: CLINIC | Age: 39
End: 2024-06-14
Payer: COMMERCIAL

## 2024-06-14 VITALS
WEIGHT: 194 LBS | SYSTOLIC BLOOD PRESSURE: 130 MMHG | TEMPERATURE: 98.2 F | BODY MASS INDEX: 32.32 KG/M2 | DIASTOLIC BLOOD PRESSURE: 83 MMHG | HEART RATE: 113 BPM | HEIGHT: 65 IN

## 2024-06-14 DIAGNOSIS — Z98.84 S/P LAPAROSCOPIC SLEEVE GASTRECTOMY: ICD-10-CM

## 2024-06-14 DIAGNOSIS — E66.9 OBESITY, CLASS I, BMI 30-34.9: Primary | ICD-10-CM

## 2024-06-14 DIAGNOSIS — Z71.3 DIETARY COUNSELING: ICD-10-CM

## 2024-06-14 PROBLEM — M70.62 GREATER TROCHANTERIC BURSITIS OF LEFT HIP: Status: ACTIVE | Noted: 2024-04-16

## 2024-06-14 PROBLEM — M76.32 ILIOTIBIAL BAND SYNDROME AFFECTING LOWER LEG, LEFT: Status: ACTIVE | Noted: 2024-04-16

## 2024-06-14 PROBLEM — E66.811 OBESITY, CLASS I, BMI 30-34.9: Status: ACTIVE | Noted: 2024-01-18

## 2024-06-14 NOTE — PROGRESS NOTES
[facility] BARIATRIC SURGERY  [address]  [facility] [id number] [phone]  [address]  [facility] [address] [phone]  Dept: [phone]  6/14/2024      Swetha Jones.  [id number]  [id number]  1985  female      Chief Complaint   Patient presents with    Follow-up     6 month follow up sleeve        BH Post-Op Bariatric Surgery:   Swetha Jones is status post Laparoscopic Sleeve with PEHR procedure, performed on 12/13/2023     HPI:   Today's weight is 88 kg (194 lb) pounds, today's BMI is Body mass index is 32.09 kg/m²., has a  loss of 24 pounds since the last visit and weight loss since surgery is 73 pounds. The patient reports a decreased portion size and loss of appetite.      Swetha Jones denies nausea,vomiting, reflux, dysphagia and reports tolerating regular diet.  Protein averages   Suspect that she may have POTS.  Bradycardia when she was younger.  Anytime bends over or squats gets dizzy and has worsened.  Seeing cardiologist soon.  Referred from neurology.  C/o constipation.  Has been trying to add fiber.  On Linzess, stool softeners.  3 diverticulitis flares since Feb.  On CT scan from Feb showed small HH.    Tried to stop ppi but heartburn returned.       Diet and Exercise: Diet history reviewed and discussed with the patient. Weight loss/gains to date discussed with the patient. The patient states they are eating  grams of protein per day. She reports eating 3 meals per day, a typical portion size of 1/2 cup, eating 2 snacks per day, drinking 5+ or more 8-oz. glasses of water per day, no carbonated beverage consumption and exercising regularly.     Supplements: bmtv.     Review of Systems   Respiratory:  Negative for shortness of breath.    Cardiovascular:  Negative for chest pain.   Neurological:  Positive for dizziness and light-headedness.   All other systems reviewed and are  negative.      Patient Active Problem List   Diagnosis    Splenomegaly    Social anxiety disorder    SAVANNAH (obstructive sleep apnea)    Multiple sclerosis    Migraine headache without aura    Large granular lymphocyte disorder    Leukocytosis    Immunocompromised    Hypersomnolence    Hearing loss in right ear    Eosinophilic esophagitis    ADD (attention deficit disorder)    Chronic fatigue    GERD (gastroesophageal reflux disease)    Gastroparesis as a child    Dysmenorrhea    Chronic bilateral low back pain with sciatica    Fibromyalgia    Trigeminal neuralgia    OCD (obsessive compulsive disorder)    Vertigo    Weakness    Depression    Fatty liver    EDS (Esdras-Danlos syndrome)    Pancolonic diverticulosis    Melanoma in situ    S/P laparoscopic sleeve gastrectomy    Dietary counseling    Obesity, Class I, BMI 30-34.9    Greater trochanteric bursitis of left hip    Iliotibial band syndrome affecting lower leg, left       Past Medical History:   Diagnosis Date    ADHD     Anxiety and depression     Arthritis     Diverticular disease     Dysmenorrhea     Dysphagia     Eczema     Fatigue     Fatty liver     Fibromyalgia     GERD (gastroesophageal reflux disease)     Hemorrhoids     Hiatal hernia     Large granular lymphocyte disorder     Migraine     Multiple sclerosis     receives IV infusions    Neck pain     Obesity, Class III, BMI 40-49.9 (morbid obesity)     OCD (obsessive compulsive disorder)     SAVANNAH (obstructive sleep apnea)     mild no machine    Paraesophageal hernia     Port-A-Cath in place     right side chest    Rosacea     Splenomegaly     Trigeminal neuralgia        The following portions of the patient's history were reviewed and updated as appropriate: allergies, current medications, past medical history, past surgical history, and problem list.    Vitals:    06/14/24 1401   BP: 130/83   Pulse: 113   Temp: 98.2 °F (36.8 °C)       Physical Exam  Vitals reviewed.   Constitutional:       General: She  is not in acute distress.     Appearance: Normal appearance. She is obese.   HENT:      Head: Normocephalic and atraumatic.      Mouth/Throat:      Mouth: Mucous membranes are moist.      Pharynx: Oropharynx is clear.   Eyes:      General: No scleral icterus.     Extraocular Movements: Extraocular movements intact.      Conjunctiva/sclera: Conjunctivae normal.      Pupils: Pupils are equal, round, and reactive to light.   Cardiovascular:      Rate and Rhythm: Normal rate and regular rhythm.   Pulmonary:      Effort: Pulmonary effort is normal. No respiratory distress.   Abdominal:      General: Bowel sounds are normal.      Palpations: Abdomen is soft.   Musculoskeletal:         General: Normal range of motion.      Cervical back: Normal range of motion and neck supple.   Skin:     General: Skin is warm and dry.   Neurological:      General: No focal deficit present.      Mental Status: She is alert and oriented to person, place, and time.   Psychiatric:         Mood and Affect: Mood normal.         Behavior: Behavior normal.         Thought Content: Thought content normal.         Judgment: Judgment normal.         Assessment:   Post-op, the patient is doing well.     Encounter Diagnoses   Name Primary?    Obesity, Class I, BMI 30-34.9 Yes    S/P laparoscopic sleeve gastrectomy     Dietary counseling        Plan:     Encouraged patient to be sure to get plenty of lean protein per day through small frequent meals all with a protein source.   Activity restrictions: none.   Recommended patient be sure to get at least 70 grams of protein per day by eating small, frequent meals all with high lean protein choices. Be sure to limit/cut back on daily carbohydrate intake. Discussed with the patient the recommended amount of water per day to intake- half of body weight in ounces. Reviewed vitamin requirements. Be sure to do routine exercise, 150 minutes per week minimum, including both cardio and strength training.      Instructions / Recommendations: dietary counseling recommended, recommended a daily protein intake of  grams, vitamin supplement(s) recommended, recommended exercising at least 150 minutes per week, behavior modifications recommended and instructed to call the office for concerns, questions, or problems.     The patient was instructed to follow up in 3 months.     Total time spent during this encounter today was 25 minutes

## 2024-06-20 ENCOUNTER — OFFICE VISIT (OUTPATIENT)
Dept: OBSTETRICS AND GYNECOLOGY | Facility: CLINIC | Age: 39
End: 2024-06-20
Payer: COMMERCIAL

## 2024-06-20 VITALS
HEIGHT: 65 IN | SYSTOLIC BLOOD PRESSURE: 110 MMHG | BODY MASS INDEX: 32.15 KG/M2 | WEIGHT: 193 LBS | DIASTOLIC BLOOD PRESSURE: 71 MMHG

## 2024-06-20 DIAGNOSIS — Z80.41 FAMILY HISTORY OF OVARIAN CANCER: ICD-10-CM

## 2024-06-20 DIAGNOSIS — N39.3 SUI (STRESS URINARY INCONTINENCE, FEMALE): ICD-10-CM

## 2024-06-20 DIAGNOSIS — Z01.411 ENCOUNTER FOR GYNECOLOGICAL EXAMINATION WITH ABNORMAL FINDING: Primary | ICD-10-CM

## 2024-06-20 DIAGNOSIS — R10.2 PELVIC PAIN: ICD-10-CM

## 2024-06-20 PROCEDURE — 99395 PREV VISIT EST AGE 18-39: CPT

## 2024-06-20 NOTE — PROGRESS NOTES
GYN ANNUAL EXAM     Chief Complaint   Patient presents with    ngyn     Here for AE  last pap 2021 normal. History of abnormal paps       HPI    Swetha is a 39 y.o. female who presents for annual well woman exam. She is a new patient to our practice. She has also been dealing with pelvic pain for some time.     OB History    No obstetric history on file.         SUBJECTIVE    MENSTRUAL & SEXUAL HEALTH  LMP: Patient's last menstrual period was 06/07/2024.  Menses regularity: regular every 28-30 days  Menses length: 5 days  Dysmenorrhea: moderate, occurring throughout menses  Cyclic symptoms: none  Current contraception: none  Last pap: 2021, Normal PAP  History of abnormal pap:  yes  History of STD: No  Family history of cancer: ovarian cancer       Family history of breast cancer: no  Performs SBE: performs monthly.  Incontinence: Yes, LUCINA  Dyspareunia: no    Past Medical History:   Diagnosis Date    ADHD     Anxiety and depression     Arthritis     Diverticular disease     Dysmenorrhea     Dysphagia     Eczema     Fatigue     Fatty liver     Fibromyalgia     GERD (gastroesophageal reflux disease)     Hemorrhoids     Hiatal hernia     Large granular lymphocyte disorder     Migraine     Multiple sclerosis     receives IV infusions    Neck pain     Obesity, Class III, BMI 40-49.9 (morbid obesity)     OCD (obsessive compulsive disorder)     SAVANNAH (obstructive sleep apnea)     mild no machine    Paraesophageal hernia     Port-A-Cath in place     right side chest    Rosacea     Splenomegaly     Trigeminal neuralgia        Past Surgical History:   Procedure Laterality Date    BONE MARROW ASPIRATE AND BIOPSY WIITH LUMBAR PUNCTURE  2020    COLONOSCOPY  2020    COLPOSCOPY W/ BIOPSY / CURETTAGE  2013    ENDOSCOPY  08/10/2023    dx eosinophilic esophagitis    GASTRIC SLEEVE LAPAROSCOPIC N/A 12/13/2023    Procedure: GASTRIC SLEEVE LAPAROSCOPIC with paraesophageal Hernia Repair;  Surgeon: Eliel Acosta Jr., MD;  Location:   [facility];  Service: Bariatric;  Laterality: N/A;    MOHS SURGERY      melanoma right shoulder    PORTACATH PLACEMENT  2016    TYMPANOSTOMY Bilateral     1988,1991,1994         Current Outpatient Medications:     acetaminophen (TYLENOL) 500 MG tablet, Take 2 tablets by mouth Every 6 (Six) Hours As Needed for Mild Pain., Disp: , Rfl:     Calcium 500-2.5 MG-MCG chewable tablet, Chew 1 tablet 3 times a day., Disp: , Rfl:     linaclotide (LINZESS) 145 MCG capsule capsule, Take 1 capsule by mouth Daily., Disp: , Rfl:     meclizine (ANTIVERT) 25 MG tablet, Take 1 tablet by mouth 3 (Three) Times a Day As Needed for Dizziness or Nausea., Disp: , Rfl:     multivitamin with minerals (Multivitamin Adult) tablet tablet, Take 1 tablet by mouth Daily., Disp: , Rfl:     natalizumab (TYSABRI) 300 MG/15ML injection, Infuse 15 mL into a venous catheter As Needed (every 42 days). MD office, Disp: , Rfl:     OXcarbazepine (TRILEPTAL) 150 MG tablet, Take 1 tablet by mouth 2 (Two) Times a Day As Needed., Disp: , Rfl:     pantoprazole (PROTONIX) 40 MG EC tablet, Take 1 tablet by mouth Daily., Disp: , Rfl:     fluticasone (FLOVENT HFA) 220 MCG/ACT inhaler, Take 2 puffs by mouth 2 (Two) Times a Day. (Patient not taking: Reported on 6/20/2024), Disp: , Rfl:     lisdexamfetamine (VYVANSE) 50 MG capsule, Take 1 capsule by mouth Daily, Disp: 30 capsule, Rfl: 2    Allergies   Allergen Reactions    Shellfish Allergy Nausea And Vomiting, Rash and Dizziness    Latex Rash    Soybean-Containing Drug Products Swelling     Throat starts to swell, hard time swallowing.       Social History     Tobacco Use    Smoking status: Never    Smokeless tobacco: Never   Vaping Use    Vaping status: Never Used   Substance Use Topics    Alcohol use: Never    Drug use: Never       Family History   Problem Relation Age of Onset    Sleep apnea Mother     Ovarian cancer Mother     OCD Mother     Hepatitis Mother     Obesity Father     Sleep apnea Father     Diabetes  "Father     Heart disease Father     Hepatitis Father     Obesity Sister     Bipolar disorder Sister     Heart disease Brother     Sleep apnea Brother     Diabetes Brother     Obesity Brother     Hypertension Brother     Heart attack Brother 37    Sleep apnea Maternal Grandmother     Heart disease Maternal Grandmother     Heart attack Maternal Grandmother     Hypertension Maternal Grandmother     Diabetes Maternal Grandmother     Throat cancer Maternal Grandfather     Hypertension Paternal Grandmother     Hypertension Paternal Grandfather     Lung cancer Paternal Grandfather     Malig Hyperthermia Neg Hx        Review of Systems   Constitutional:  Negative for fatigue, unexpected weight gain and unexpected weight loss.   Gastrointestinal:  Negative for abdominal pain.   Genitourinary:  Positive for pelvic pain and urinary incontinence. Negative for decreased libido, difficulty urinating, dyspareunia, dysuria, pelvic pressure, urgency and vaginal discharge.   All other systems reviewed and are negative.      OBJECTIVE    /71   Ht 165.6 cm (65.2\")   Wt 87.5 kg (193 lb)   LMP 06/07/2024   BMI 31.92 kg/m²     Physical Exam  Constitutional:       General: She is awake. She is not in acute distress.     Appearance: Normal appearance. She is well-developed and well-groomed. She is not ill-appearing.   Genitourinary:      Vulva, bladder and urethral meatus normal.      Right Labia: No rash, tenderness, lesions or skin changes.     Left Labia: No tenderness, lesions, skin changes or rash.     No labial fusion noted.      No inguinal adenopathy present in the right or left side.     No vaginal discharge, erythema, tenderness, bleeding, ulceration or granulation tissue.      No vaginal prolapse present.     No vaginal atrophy present.     No cervical discharge, friability, lesion, polyp, eversion or elongation.      Uterus is not enlarged, tender or prolapsed.      Pelvic exam was performed with patient in the " lithotomy position.   Breasts:     Breasts are symmetrical.      Breasts are soft.     Right: Normal.      Left: Normal.   HENT:      Head: Normocephalic and atraumatic.   Eyes:      General: No scleral icterus.     Conjunctiva/sclera: Conjunctivae normal.   Cardiovascular:      Rate and Rhythm: Normal rate and regular rhythm.      Heart sounds: Normal heart sounds.   Pulmonary:      Effort: Pulmonary effort is normal.      Breath sounds: Normal breath sounds.   Abdominal:      Palpations: Abdomen is soft.      Hernia: There is no hernia in the left inguinal area or right inguinal area.   Musculoskeletal:         General: Normal range of motion.      Cervical back: Normal range of motion.   Lymphadenopathy:      Lower Body: No right inguinal adenopathy. No left inguinal adenopathy.   Neurological:      Mental Status: She is alert.   Skin:     General: Skin is warm and dry.      Coloration: Skin is not jaundiced or pale.   Psychiatric:         Behavior: Behavior normal. Behavior is cooperative.   Vitals reviewed.         ASSESSMENT     Diagnoses and all orders for this visit:    1. Encounter for gynecological examination with abnormal finding (Primary)  -     IGP, Apt HPV,rfx 16 / 18,45    2. Pelvic pain  -     US Non-ob Transvaginal; Future    3. LUCINA (stress urinary incontinence, female)  -     Ambulatory Referral to Physical Therapy for Evaluation & Treatment    4. Family history of ovarian cancer         PLAN   WELL WOMAN EXAM: Pap smear collected today. Recommend MVI daily.    CONTRACEPTION: none.   FAMILY HISTORY OF OVARIAN CANCER: Information provided via patient message regarding the ovarian cancer screening program at [facility].   SMOKING STATUS: non smoker.  BREAST HEALTH: Encouraged annual mammogram screening starting at age 40. Instructed on how to perform SBE. Encouraged breast health self awareness.  EXERCISE: Encouraged 150 minutes of exercise per week if not medially contraindicated.    BMI: Body mass index is 31.92 kg/m².     Return in about 1 year (around 6/20/2025) for annual physical.    I spent 15 minutes caring for Swetha on this date of service. This time includes time spent by me in the following activities: preparing for the visit, reviewing tests, performing a medically appropriate examination and/or evaluation, counseling and educating the patient/family/caregiver, referring and communicating with other health care professionals, documenting information in the medical record, independently interpreting results and communicating that information with the patient/family/caregiver, care coordination, ordering medications, ordering test(s), ordering procedure(s), obtaining a separately obtained history, and reviewing a separately obtained history.    Melany Breaux CNM  6/22/2024  09:55 EDT

## 2024-06-24 LAB
CYTOLOGIST CVX/VAG CYTO: NORMAL
CYTOLOGY CVX/VAG DOC CYTO: NORMAL
CYTOLOGY CVX/VAG DOC THIN PREP: NORMAL
DX ICD CODE: NORMAL
HPV I/H RISK 4 DNA CVX QL PROBE+SIG AMP: NEGATIVE
Lab: NORMAL
OTHER STN SPEC: NORMAL
STAT OF ADQ CVX/VAG CYTO-IMP: NORMAL

## 2024-06-24 NOTE — PATIENT INSTRUCTIONS
[facility] OVARIAN CANCER SCREENING PROGRAM    The Ovarian Cancer Screening Program at [facility] is a clinical research study. The [facility] Ovarian Cancer Screening Program provides free annual sonographic screenings to women across [place] with the goal of detecting cancer early. When it’s caught early, ovarian cancer is a treatable and curable disease.      SCREENING ELIGIBILITY    All women over the age of 50 (including those who have no symptoms and no personal history of ovarian cancer) are eligible for a free ovarian cancer screening.  Women over the age of 25 who have a family history of ovarian cancer are also eligible for a free screening.  Any woman in one of these two groups should contact us at [phone] to schedule an appointment.      HOURS AND CONTACT INFORMATION    Hours of Operation:  Monday - Friday: 8 a.m. - 4:30 p.m.  To schedule an appointment, please consider one of the following points of contact:    Call:   [phone]  [phone]    Please plan on scheduling your screening appointments two or more months in advance.

## 2024-06-24 NOTE — PROGRESS NOTES
GYN VISIT    Chief Complaint   Patient presents with    Follow-up     Here today to discuss U/S        SUBJECTIVE    Swetha is a 39 y.o. No obstetric history on file. who presents for a follow up for pelvic pain. She had an ultrasound performed prior to our visit together today. She reports that she is still having some pelvic pain. She also reports that she cannot take NSAIDs due to her gastrointestinal issues.     LMP: Patient's last menstrual period was 06/07/2024.    Past Medical History:   Diagnosis Date    ADHD     Anxiety and depression     Arthritis     Diverticular disease     Dysmenorrhea     Dysphagia     Eczema     Fatigue     Fatty liver     Fibromyalgia     GERD (gastroesophageal reflux disease)     Hemorrhoids     Hiatal hernia     Large granular lymphocyte disorder     Migraine     Multiple sclerosis     receives IV infusions    Neck pain     Obesity, Class III, BMI 40-49.9 (morbid obesity)     OCD (obsessive compulsive disorder)     SAVANNAH (obstructive sleep apnea)     mild no machine    Paraesophageal hernia     Port-A-Cath in place     right side chest    Rosacea     Splenomegaly     Trigeminal neuralgia         Past Surgical History:   Procedure Laterality Date    BONE MARROW ASPIRATE AND BIOPSY WIITH LUMBAR PUNCTURE  2020    COLONOSCOPY  2020    COLPOSCOPY W/ BIOPSY / CURETTAGE  2013    ENDOSCOPY  08/10/2023    dx eosinophilic esophagitis    GASTRIC SLEEVE LAPAROSCOPIC N/A 12/13/2023    Procedure: GASTRIC SLEEVE LAPAROSCOPIC with paraesophageal Hernia Repair;  Surgeon: Eliel Acosta Jr., MD;  [place] [facility] [place] [facility] [place]  Service: Bariatric;  Laterality: N/A;    MOHS SURGERY      melanoma right shoulder    PORTACATH PLACEMENT  2016    TYMPANOSTOMY Bilateral     1988,1991,1994        Review of Systems   Genitourinary:  Positive for pelvic pain. Negative for decreased urine volume, difficulty urinating, dyspareunia, dysuria, flank pain, frequency, genital sores, hematuria, menstrual problem, pelvic  "pressure, urgency, urinary incontinence, vaginal bleeding, vaginal discharge and vaginal pain.   All other systems reviewed and are negative.      OBJECTIVE     Vitals:    06/26/24 1444   BP: 109/75   Weight: 87.5 kg (193 lb)   Height: 165.6 cm (65.2\")        Physical Exam  Constitutional:       General: She is awake.      Appearance: Normal appearance. She is well-developed and well-groomed.   HENT:      Head: Normocephalic and atraumatic.   Pulmonary:      Effort: Pulmonary effort is normal.   Musculoskeletal:      Cervical back: Normal range of motion.   Neurological:      General: No focal deficit present.      Mental Status: She is alert and oriented to person, place, and time.   Skin:     General: Skin is warm and dry.   Psychiatric:         Mood and Affect: Mood normal.         Behavior: Behavior normal. Behavior is cooperative.   Vitals reviewed.         ASSESSMENT/PLAN    Diagnoses and all orders for this visit:    1. Pelvic pain (Primary)  -     traMADol (Ultram) 50 MG tablet; Take 1 tablet by mouth Every 6 (Six) Hours As Needed for Severe Pain.  Dispense: 15 tablet; Refill: 0    2. Family history of ovarian cancer  -     Ovarian Malignancy Risk-GRACIELA; Future      Discussed TVUS results - see preliminary read below.   As Swetha is unable to take NSAIDs due to her GI issues, I have sent a very short prescription in for tramadol 50 mg to only take for severe pain.   Swetha would like to be evaluated for endometriosis with one of the MDs and will make that appointment as scheduling allows to discuss potential laparotomy to evaluate for that.   She reports that she would feel reassured by having a GRACIELA risk level drawn today as well.   Swetha will return in 3 months for a repeat ultrasound and office visit with me.     Return in about 3 months (around 9/26/2024) for ultrasound + office visit.    I spent 15 minutes caring for Swetha on this date of service. This time includes time spent by me in the following " activities: preparing for the visit, reviewing tests, performing a medically appropriate examination and/or evaluation, counseling and educating the patient/family/caregiver, referring and communicating with other health care professionals, documenting information in the medical record, independently interpreting results and communicating that information with the patient/family/caregiver, care coordination, ordering medications, ordering test(s), ordering procedure(s), obtaining a separately obtained history, and reviewing a separately obtained history    Melany Breaux CNM  2024  15:34 EDT      TRANSVAGINAL ULTRASOUND PRELIMINARY RESULT   2024  DIAGNOSIS: pelvic pain  UTERUS:  length 8.23 cm, volume: 91.297 cm3  ENDOMETRIAL LININ.67 cm   FIBROIDS/POLYPS: not seen. .   OVARIES: right - 3.80 x 2.72 x 2.21 cm, left - 3.62 x 1.98 x 2.12 cm.   OVARIAN CYSTS/MASSES: seen-complex right ovarian follicle: 2.29 x 2.04 x 2.17 cm.  COMPARATIVE DATA: not available for examination  COMMENTS: uterus is anteverted, normal uterus with no obvious endometrial mass seen, normal appearing left ovary, complex right ovarian follicle noted, free fluid noted in posterior cul de sac 2.44 x 2.84 x 2.05 cm.   Melany Breaxu CNM  2024  15:34 EDT

## 2024-06-26 ENCOUNTER — OFFICE VISIT (OUTPATIENT)
Dept: OBSTETRICS AND GYNECOLOGY | Facility: CLINIC | Age: 39
End: 2024-06-26
Payer: COMMERCIAL

## 2024-06-26 VITALS
SYSTOLIC BLOOD PRESSURE: 109 MMHG | WEIGHT: 193 LBS | HEIGHT: 65 IN | BODY MASS INDEX: 32.15 KG/M2 | DIASTOLIC BLOOD PRESSURE: 75 MMHG

## 2024-06-26 DIAGNOSIS — R10.2 PELVIC PAIN: Primary | ICD-10-CM

## 2024-06-26 DIAGNOSIS — Z80.41 FAMILY HISTORY OF OVARIAN CANCER: ICD-10-CM

## 2024-06-26 RX ORDER — TRAMADOL HYDROCHLORIDE 50 MG/1
50 TABLET ORAL EVERY 6 HOURS PRN
Qty: 15 TABLET | Refills: 0 | Status: SHIPPED | OUTPATIENT
Start: 2024-06-26

## 2024-06-27 ENCOUNTER — LAB (OUTPATIENT)
Dept: LAB | Facility: HOSPITAL | Age: 39
End: 2024-06-27
Payer: COMMERCIAL

## 2024-06-27 DIAGNOSIS — Z80.41 FAMILY HISTORY OF OVARIAN CANCER: ICD-10-CM

## 2024-06-27 PROCEDURE — 81500 ONCO (OVAR) TWO PROTEINS: CPT

## 2024-06-27 PROCEDURE — 36415 COLL VENOUS BLD VENIPUNCTURE: CPT

## 2024-06-29 LAB
CANCER AG125 SERPL-ACNC: 13.3 U/ML (ref 0–38.1)
HE4 SERPL-SCNC: 34.8 PMOL/L (ref 0–61.2)
LABORATORY COMMENT REPORT: NORMAL
POSTMENOPAUSAL INTERP: LOW: NORMAL
PREMENOPAUSAL INTERP: LOW: NORMAL
ROMA SCORE POSTMEN SERPL: 0.76
ROMA SCORE PREMEN SERPL: 0.33

## 2024-07-08 ENCOUNTER — PATIENT MESSAGE (OUTPATIENT)
Dept: OBSTETRICS AND GYNECOLOGY | Facility: CLINIC | Age: 39
End: 2024-07-08
Payer: COMMERCIAL

## 2024-07-08 ENCOUNTER — PATIENT ROUNDING (BHMG ONLY) (OUTPATIENT)
Dept: OBSTETRICS AND GYNECOLOGY | Facility: CLINIC | Age: 39
End: 2024-07-08
Payer: COMMERCIAL

## 2024-07-11 RX ORDER — URSODIOL 300 MG/1
300 CAPSULE ORAL 2 TIMES DAILY
Qty: 60 CAPSULE | Refills: 5 | OUTPATIENT
Start: 2024-07-11

## 2024-07-31 ENCOUNTER — OFFICE VISIT (OUTPATIENT)
Dept: OBSTETRICS AND GYNECOLOGY | Facility: CLINIC | Age: 39
End: 2024-07-31
Payer: COMMERCIAL

## 2024-07-31 VITALS
BODY MASS INDEX: 31.16 KG/M2 | DIASTOLIC BLOOD PRESSURE: 71 MMHG | WEIGHT: 187 LBS | SYSTOLIC BLOOD PRESSURE: 121 MMHG | HEIGHT: 65 IN

## 2024-07-31 DIAGNOSIS — R10.2 PELVIC PAIN: Primary | ICD-10-CM

## 2024-07-31 RX ORDER — METHYLPREDNISOLONE 4 MG/1
TABLET ORAL
COMMUNITY
Start: 2024-07-29

## 2024-07-31 NOTE — PROGRESS NOTES
Chief Complaint   Patient presents with    Gynecologic Exam     Pre op         SUBJECTIVE:     Swteha Jones is a 39 y.o. No obstetric history on file. who presents with    Severe pain around period and ovulation since 15 years. Plan all the time but this is when it is exacerbated.    Infertility issues, tried for long time.         Initially birth control felt lessening in the first month and it is something that always returns.  Messed her up the longer she took it.   Frequent diverticulitis flares - right before or during periods.     Periods are regular, typically lasting 5-8 days, heavy flow.     Tysabri     Past Medical History:   Diagnosis Date    ADHD     Anxiety and depression     Arthritis     Diverticular disease     Dysmenorrhea     Dysphagia     Eczema     Fatigue     Fatty liver     Fibromyalgia     GERD (gastroesophageal reflux disease)     Hemorrhoids     Hiatal hernia     Large granular lymphocyte disorder     Migraine     Multiple sclerosis     receives IV infusions    Neck pain     Obesity, Class III, BMI 40-49.9 (morbid obesity)     OCD (obsessive compulsive disorder)     SAVANNAH (obstructive sleep apnea)     mild no machine    Paraesophageal hernia     Port-A-Cath in place     right side chest    Rosacea     Splenomegaly     Trigeminal neuralgia       Past Surgical History:   Procedure Laterality Date    BONE MARROW ASPIRATE AND BIOPSY WIITH LUMBAR PUNCTURE      COLONOSCOPY      COLPOSCOPY W/ BIOPSY / CURETTAGE      ENDOSCOPY  08/10/2023    dx eosinophilic esophagitis    GASTRIC SLEEVE LAPAROSCOPIC N/A 2023    Procedure: GASTRIC SLEEVE LAPAROSCOPIC with paraesophageal Hernia Repair;  Surgeon: Eliel Acosta Jr., MD;  Location: [facility] [place];  Service: Bariatric;  Laterality: N/A;    MOHS SURGERY      melanoma right shoulder    PORTACATH PLACEMENT  2016    TYMPANOSTOMY Bilateral     ,,      Social History     Tobacco Use    Smoking status: Never    Smokeless  "tobacco: Never   Vaping Use    Vaping status: Never Used   Substance Use Topics    Alcohol use: Never    Drug use: Never     OB History   No obstetric history on file.        Review of Systems    OBJECTIVE:   Vitals:    07/31/24 1123   BP: 121/71   Weight: 84.8 kg (187 lb)   Height: 165.6 cm (65.2\")        Physical Exam    ASSESSMENT:   No diagnosis found.    PLAN:   See below for orders    No orders of the defined types were placed in this encounter.     No follow-ups on file.    Diagnostic laparoscopy, possible resection, chrombopertubation   "

## 2024-09-13 ENCOUNTER — OFFICE VISIT (OUTPATIENT)
Dept: BARIATRICS/WEIGHT MGMT | Facility: CLINIC | Age: 39
End: 2024-09-13
Payer: COMMERCIAL

## 2024-09-13 ENCOUNTER — TELEPHONE (OUTPATIENT)
Dept: OBSTETRICS AND GYNECOLOGY | Facility: CLINIC | Age: 39
End: 2024-09-13
Payer: COMMERCIAL

## 2024-09-13 VITALS
HEIGHT: 65 IN | BODY MASS INDEX: 29.82 KG/M2 | HEART RATE: 96 BPM | SYSTOLIC BLOOD PRESSURE: 123 MMHG | DIASTOLIC BLOOD PRESSURE: 87 MMHG | WEIGHT: 179 LBS | TEMPERATURE: 98.2 F

## 2024-09-13 DIAGNOSIS — Z98.84 S/P LAPAROSCOPIC SLEEVE GASTRECTOMY: ICD-10-CM

## 2024-09-13 DIAGNOSIS — E66.3 OVERWEIGHT WITH BODY MASS INDEX (BMI) 25.0-29.9: Primary | ICD-10-CM

## 2024-09-13 DIAGNOSIS — Z71.3 DIETARY COUNSELING: ICD-10-CM

## 2024-09-13 PROBLEM — E66.9 OBESITY, CLASS I, BMI 30-34.9: Status: RESOLVED | Noted: 2024-01-18 | Resolved: 2024-09-13

## 2024-09-13 PROBLEM — E66.811 OBESITY, CLASS I, BMI 30-34.9: Status: RESOLVED | Noted: 2024-01-18 | Resolved: 2024-09-13

## 2024-09-13 NOTE — TELEPHONE ENCOUNTER
Pt was seen on 7/31 for GYN exam. Pt states she's suppose to have an upcoming surgery of Diagnostic Laparoscopy and somebody was suppose to call her to get it scheduled. However no order has been placed.  Please advise  thanks

## 2024-09-13 NOTE — PROGRESS NOTES
[facility] BARIATRIC SURGERY  [address]  [facility] [id number] [phone]  [address]  [facility] [id number] [phone]  Dept: [phone]  9/13/2024      Swetha Jones.  [id number]  [id number]  1985  female      Chief Complaint   Patient presents with    Follow-up     9 months Follow up sleeve        BH Post-Op Bariatric Surgery:   Swetha Jones is status post Laparoscopic Sleeve/PEHR procedure, performed on 12/13/2023     HPI:   Today's weight is 81.2 kg (179 lb) pounds, today's BMI is Body mass index is 29.61 kg/m²., has a  loss of 15 pounds since the last visit and weight loss since surgery is 88 pounds. The patient reports a decreased portion size and loss of appetite.      Swetha Jones denies nausea, vomiting, reflux, dysphagia and reports tolerating regular diet.  Had been trying to stick to 1200 calorie diet but has been struggling with some low blood sugar.    Has been more active recently.  Has increased to 1500 calories and feels better.  Stopped taking pantoprazole.  No issues when stopped.  No diverticulitis pains.    Uses melinda to track intake.    Laparoscopic surgery for ovarian cysts in the future.  Found out she has a heart shaped uterus.    Sjogrens syndrome possibility.  C/o dry mouth, eyes, skin.  Saw eye md and dx with prolapsed lacrimal duct.         Diet and Exercise: Diet history reviewed and discussed with the patient. Weight loss/gains to date discussed with the patient. The patient states they are eating 100-120 grams of protein per day. She reports eating 3 meals per day, a typical portion size of 1/2 cup, eating 3 snacks per day, drinking 5+ or more 8-oz. glasses of water per day, no carbonated beverage consumption and exercising regularly.     Supplements: bmtv.     Review of Systems   HENT:          Dry eyes, mouth   All other systems reviewed and are negative.      Patient Active Problem List    Diagnosis    Splenomegaly    Social anxiety disorder    SAVANNAH (obstructive sleep apnea)    Multiple sclerosis    Migraine headache without aura    Large granular lymphocyte disorder    Leukocytosis    Immunocompromised    Hypersomnolence    Hearing loss in right ear    Eosinophilic esophagitis    ADD (attention deficit disorder)    Chronic fatigue    GERD (gastroesophageal reflux disease)    Gastroparesis as a child    Dysmenorrhea    Chronic bilateral low back pain with sciatica    Fibromyalgia    Trigeminal neuralgia    OCD (obsessive compulsive disorder)    Vertigo    Weakness    Depression    Fatty liver    EDS (Esdras-Danlos syndrome)    Pancolonic diverticulosis    Melanoma in situ    S/P laparoscopic sleeve gastrectomy with PEHR    Dietary counseling    Greater trochanteric bursitis of left hip    Iliotibial band syndrome affecting lower leg, left    Overweight with body mass index (BMI) 25.0-29.9       Past Medical History:   Diagnosis Date    ADHD     Anxiety and depression     Arthritis     Diverticular disease     Dysmenorrhea     Dysphagia     Eczema     Fatigue     Fatty liver     Fibromyalgia     GERD (gastroesophageal reflux disease)     Hemorrhoids     Hiatal hernia     Large granular lymphocyte disorder     Migraine     Multiple sclerosis     receives IV infusions    Neck pain     Obesity, Class III, BMI 40-49.9 (morbid obesity)     OCD (obsessive compulsive disorder)     SAVANNAH (obstructive sleep apnea)     mild no machine    Paraesophageal hernia     Port-A-Cath in place     right side chest    Rosacea     Splenomegaly     Trigeminal neuralgia        The following portions of the patient's history were reviewed and updated as appropriate: allergies, current medications, past medical history, past surgical history, and problem list.    Vitals:    09/13/24 1409   BP: 123/87   Pulse: 96   Temp: 98.2 °F (36.8 °C)       Physical Exam  Vitals reviewed.   Constitutional:       General: She is not in acute  distress.     Appearance: Normal appearance. She is well-developed and well-groomed.   HENT:      Head: Normocephalic and atraumatic.      Mouth/Throat:      Mouth: Mucous membranes are moist.      Pharynx: Oropharynx is clear.   Eyes:      General: No scleral icterus.     Extraocular Movements: Extraocular movements intact.      Conjunctiva/sclera: Conjunctivae normal.      Pupils: Pupils are equal, round, and reactive to light.   Cardiovascular:      Rate and Rhythm: Normal rate and regular rhythm.   Pulmonary:      Effort: Pulmonary effort is normal. No respiratory distress.   Abdominal:      General: Bowel sounds are normal.      Palpations: Abdomen is soft.   Musculoskeletal:         General: Normal range of motion.      Cervical back: Normal range of motion and neck supple.   Skin:     General: Skin is warm and dry.   Neurological:      General: No focal deficit present.      Mental Status: She is alert and oriented to person, place, and time.   Psychiatric:         Mood and Affect: Mood normal.         Behavior: Behavior normal. Behavior is cooperative.         Thought Content: Thought content normal.         Judgment: Judgment normal.         Assessment:   Post-op, the patient is doing well.     Encounter Diagnoses   Name Primary?    Overweight with body mass index (BMI) 25.0-29.9 Yes    S/P laparoscopic sleeve gastrectomy with PEHR     Dietary counseling        Plan:     Encouraged patient to be sure to get plenty of lean protein per day through small frequent meals all with a protein source.   Activity restrictions: none.   Recommended patient be sure to get at least 70 grams of protein per day by eating small, frequent meals all with high lean protein choices. Be sure to limit/cut back on daily carbohydrate intake. Discussed with the patient the recommended amount of water per day to intake- half of body weight in ounces. Reviewed vitamin requirements. Be sure to do routine exercise, 150 minutes per week  minimum, including both cardio and strength training.     Instructions / Recommendations: dietary counseling recommended, recommended a daily protein intake of  grams, vitamin supplement(s) recommended, recommended exercising at least 150 minutes per week, behavior modifications recommended and instructed to call the office for concerns, questions, or problems.     The patient was instructed to follow up in 3 months.     Total time spent during this encounter today was 25 minutes

## 2024-09-25 ENCOUNTER — OFFICE VISIT (OUTPATIENT)
Dept: OBSTETRICS AND GYNECOLOGY | Facility: CLINIC | Age: 39
End: 2024-09-25
Payer: COMMERCIAL

## 2024-09-25 VITALS
HEIGHT: 65 IN | BODY MASS INDEX: 29.66 KG/M2 | SYSTOLIC BLOOD PRESSURE: 106 MMHG | DIASTOLIC BLOOD PRESSURE: 72 MMHG | WEIGHT: 178 LBS

## 2024-09-25 DIAGNOSIS — R10.2 PELVIC PAIN: Primary | ICD-10-CM

## 2024-09-25 DIAGNOSIS — Z80.41 FAMILY HISTORY OF OVARIAN CANCER: ICD-10-CM

## 2024-09-26 ENCOUNTER — TELEPHONE (OUTPATIENT)
Dept: OBSTETRICS AND GYNECOLOGY | Facility: CLINIC | Age: 39
End: 2024-09-26
Payer: COMMERCIAL

## 2024-09-26 PROBLEM — R10.2 PELVIC PAIN: Status: ACTIVE | Noted: 2024-07-31

## 2024-10-02 ENCOUNTER — TELEPHONE (OUTPATIENT)
Dept: OBSTETRICS AND GYNECOLOGY | Facility: CLINIC | Age: 39
End: 2024-10-02
Payer: COMMERCIAL

## 2024-10-03 ENCOUNTER — TELEPHONE (OUTPATIENT)
Dept: OBSTETRICS AND GYNECOLOGY | Facility: CLINIC | Age: 39
End: 2024-10-03
Payer: COMMERCIAL

## 2024-10-08 ENCOUNTER — PRE-ADMISSION TESTING (OUTPATIENT)
Dept: PREADMISSION TESTING | Facility: HOSPITAL | Age: 39
End: 2024-10-08
Payer: COMMERCIAL

## 2024-10-08 ENCOUNTER — TELEPHONE (OUTPATIENT)
Dept: OBSTETRICS AND GYNECOLOGY | Facility: CLINIC | Age: 39
End: 2024-10-08
Payer: COMMERCIAL

## 2024-10-08 VITALS
WEIGHT: 179.3 LBS | HEIGHT: 66 IN | TEMPERATURE: 97.5 F | DIASTOLIC BLOOD PRESSURE: 76 MMHG | RESPIRATION RATE: 18 BRPM | SYSTOLIC BLOOD PRESSURE: 128 MMHG | HEART RATE: 89 BPM | OXYGEN SATURATION: 99 % | BODY MASS INDEX: 28.82 KG/M2

## 2024-10-08 LAB
ANION GAP SERPL CALCULATED.3IONS-SCNC: 9 MMOL/L (ref 5–15)
BUN SERPL-MCNC: 21 MG/DL (ref 6–20)
BUN/CREAT SERPL: 26.9 (ref 7–25)
CALCIUM SPEC-SCNC: 9.9 MG/DL (ref 8.6–10.5)
CHLORIDE SERPL-SCNC: 104 MMOL/L (ref 98–107)
CO2 SERPL-SCNC: 24 MMOL/L (ref 22–29)
CREAT SERPL-MCNC: 0.78 MG/DL (ref 0.57–1)
DEPRECATED RDW RBC AUTO: 43.2 FL (ref 37–54)
EGFRCR SERPLBLD CKD-EPI 2021: 99.8 ML/MIN/1.73
ERYTHROCYTE [DISTWIDTH] IN BLOOD BY AUTOMATED COUNT: 13.2 % (ref 12.3–15.4)
GLUCOSE SERPL-MCNC: 69 MG/DL (ref 65–99)
HCT VFR BLD AUTO: 41.5 % (ref 34–46.6)
HGB BLD-MCNC: 13.3 G/DL (ref 12–15.9)
MCH RBC QN AUTO: 28.8 PG (ref 26.6–33)
MCHC RBC AUTO-ENTMCNC: 32 G/DL (ref 31.5–35.7)
MCV RBC AUTO: 89.8 FL (ref 79–97)
PLATELET # BLD AUTO: 241 10*3/MM3 (ref 140–450)
PMV BLD AUTO: 11 FL (ref 6–12)
POTASSIUM SERPL-SCNC: 4.6 MMOL/L (ref 3.5–5.2)
QT INTERVAL: 358 MS
QTC INTERVAL: 406 MS
RBC # BLD AUTO: 4.62 10*6/MM3 (ref 3.77–5.28)
SODIUM SERPL-SCNC: 137 MMOL/L (ref 136–145)
WBC NRBC COR # BLD AUTO: 6.17 10*3/MM3 (ref 3.4–10.8)

## 2024-10-08 PROCEDURE — 93005 ELECTROCARDIOGRAM TRACING: CPT

## 2024-10-08 PROCEDURE — 93010 ELECTROCARDIOGRAM REPORT: CPT | Performed by: INTERNAL MEDICINE

## 2024-10-08 PROCEDURE — 80048 BASIC METABOLIC PNL TOTAL CA: CPT

## 2024-10-08 PROCEDURE — 36415 COLL VENOUS BLD VENIPUNCTURE: CPT

## 2024-10-08 PROCEDURE — 85027 COMPLETE CBC AUTOMATED: CPT

## 2024-10-08 RX ORDER — LEVOCETIRIZINE DIHYDROCHLORIDE 5 MG/1
5 TABLET, FILM COATED ORAL EVERY EVENING
COMMUNITY

## 2024-10-08 NOTE — DISCHARGE INSTRUCTIONS
Take the following medications the morning of surgery:  TRILEPTAL     If you are on prescription narcotic pain medication to control your pain you may also take that medication the morning of surgery.      General Instructions:     Do not eat solid food after midnight the night before surgery.  Clear liquids day of surgery are allowed but must be stopped at least two hours before your hospital arrival time.       Allowed clear liquids      Water, sodas, and tea or coffee with no cream or milk added.       12 to 20 ounces of a clear liquid that contains carbohydrates is recommended.  If non-diabetic, have Gatorade or Powerade.  If diabetic, have G2 or Powerade Zero.     Do not have liquids red in color.  Do not consume chicken, beef, pork or vegetable broth or bouillon cubes of any variety as they are not considered clear liquids and are not allowed.      Infants may have breast milk up to four hours before surgery.  Infants drinking formula may drink formula up to six hours before surgery.   Patients who avoid smoking, chewing tobacco and alcohol for 4 weeks prior to surgery have a reduced risk of post-operative complications.  Quit smoking as many days before surgery as you can.  Do not smoke, use chewing tobacco or drink alcohol the day of surgery.   If applicable bring your C-PAP/ BI-PAP machine in with you to preop day of surgery.  Bring any papers given to you in the doctor’s office.  Wear clean comfortable clothes.  Do not wear contact lenses, false eyelashes or make-up.  Bring a case for your glasses.   Bring crutches or walker if applicable.  Remove all piercings.  Leave jewelry and any other valuables at home.  Hair extensions with metal clips must be removed prior to surgery.  The Pre-Admission Testing nurse will instruct you to bring medications if unable to obtain an accurate list in Pre-Admission Testing.        If you were given a blood bank ID arm band remember to bring it with you the day of  surgery.    Preventing a Surgical Site Infection:  For 2 to 3 days before surgery, avoid shaving with a razor because the razor can irritate skin and make it easier to develop an infection.    Any areas of open skin can increase the risk of a post-operative wound infection by allowing bacteria to enter and travel throughout the body.  Notify your surgeon if you have any skin wounds / rashes even if it is not near the expected surgical site.  The area will need assessed to determine if surgery should be delayed until it is healed.  The night prior to surgery shower using a fresh bar of anti-bacterial soap (such as Dial) and clean washcloth.  Sleep in a clean bed with clean clothing.  Do not allow pets to sleep with you.  Shower on the morning of surgery using a fresh bar of anti-bacterial soap (such as Dial) and clean washcloth.  Dry with a clean towel and dress in clean clothing.  Ask your surgeon if you will be receiving antibiotics prior to surgery.  Make sure you, your family, and all healthcare providers clean their hands with soap and water or an alcohol based hand  before caring for you or your wound.    Day of surgery:  Your arrival time is approximately two hours before your scheduled surgery time.  Please note if you have an early arrival time the surgery doors do not open before 5:00 AM.  Upon arrival, a Pre-op nurse and Anesthesiologist will review your health history, obtain vital signs, and answer questions you may have.  The only belongings needed at this time will be a list of your home medications and if applicable your C-PAP/BI-PAP machine.  A Pre-op nurse will start an IV and you may receive medication in preparation for surgery, including something to help you relax.     Please be aware that surgery does come with discomfort.  We want to make every effort to control your discomfort so please discuss any uncontrolled symptoms with your nurse.   Your doctor will most likely have prescribed  pain medications.      If you are going home after surgery you will receive individualized written care instructions before being discharged.  A responsible adult must drive you to and from the hospital on the day of your surgery and ideally stay with you through the night.   .  Discharge prescriptions can be filled by the hospital pharmacy during regular pharmacy hours.  If you are having surgery late in the day/evening your prescription may be e-prescribed to your pharmacy.  Please verify your pharmacy hours or chose a 24 hour pharmacy to avoid not having access to your prescription because your pharmacy has closed for the day.    If you are staying overnight following surgery, you will be transported to your hospital room following the recovery period.  [facility] has all private rooms.    If you have any questions please call Pre-Admission Testing at [phone].  Deductibles and co-payments are collected on the day of service. Please be prepared to pay the required co-pay, deductible or deposit on the day of service as defined by your plan.    Call your surgeon immediately if you experience any of the following symptoms:  Sore Throat  Shortness of Breath or difficulty breathing  Cough  Chills  Body soreness or muscle pain  Headache  Fever  New loss of taste or smell  Do not arrive for your surgery ill.  Your procedure will need to be rescheduled to another time.  You will need to call your physician before the day of surgery to avoid any unnecessary exposure to hospital staff as well as other patients.

## 2024-10-22 ENCOUNTER — HOSPITAL ENCOUNTER (OUTPATIENT)
Facility: HOSPITAL | Age: 39
Setting detail: HOSPITAL OUTPATIENT SURGERY
Discharge: HOME | End: 2024-10-22
Attending: STUDENT IN AN ORGANIZED HEALTH CARE EDUCATION/TRAINING PROGRAM | Admitting: STUDENT IN AN ORGANIZED HEALTH CARE EDUCATION/TRAINING PROGRAM
Payer: COMMERCIAL

## 2024-10-22 ENCOUNTER — ANESTHESIA EVENT (OUTPATIENT)
Dept: PERIOP | Facility: HOSPITAL | Age: 39
End: 2024-10-22
Payer: COMMERCIAL

## 2024-10-22 ENCOUNTER — ANESTHESIA (OUTPATIENT)
Dept: PERIOP | Facility: HOSPITAL | Age: 39
End: 2024-10-22
Payer: COMMERCIAL

## 2024-10-22 VITALS
OXYGEN SATURATION: 98 % | RESPIRATION RATE: 16 BRPM | DIASTOLIC BLOOD PRESSURE: 52 MMHG | TEMPERATURE: 97.8 F | SYSTOLIC BLOOD PRESSURE: 92 MMHG | HEART RATE: 78 BPM

## 2024-10-22 DIAGNOSIS — R10.2 PELVIC PAIN: ICD-10-CM

## 2024-10-22 DIAGNOSIS — Z98.890 S/P LAPAROSCOPY: Primary | ICD-10-CM

## 2024-10-22 LAB
B-HCG UR QL: NEGATIVE
EXPIRATION DATE: NORMAL
INTERNAL NEGATIVE CONTROL: NORMAL
INTERNAL POSITIVE CONTROL: NORMAL
Lab: NORMAL

## 2024-10-22 PROCEDURE — 25010000002 PROPOFOL 10 MG/ML EMULSION: Performed by: NURSE ANESTHETIST, CERTIFIED REGISTERED

## 2024-10-22 PROCEDURE — 25010000002 KETOROLAC TROMETHAMINE PER 15 MG: Performed by: NURSE ANESTHETIST, CERTIFIED REGISTERED

## 2024-10-22 PROCEDURE — 81025 URINE PREGNANCY TEST: CPT | Performed by: ANESTHESIOLOGY

## 2024-10-22 PROCEDURE — 25010000002 PROPOFOL 200 MG/20ML EMULSION: Performed by: NURSE ANESTHETIST, CERTIFIED REGISTERED

## 2024-10-22 PROCEDURE — 25010000002 HYDROMORPHONE 1 MG/ML SOLUTION: Performed by: NURSE ANESTHETIST, CERTIFIED REGISTERED

## 2024-10-22 PROCEDURE — 25010000002 LIDOCAINE 2% SOLUTION: Performed by: NURSE ANESTHETIST, CERTIFIED REGISTERED

## 2024-10-22 PROCEDURE — 25010000002 DEXAMETHASONE SODIUM PHOSPHATE 20 MG/5ML SOLUTION: Performed by: NURSE ANESTHETIST, CERTIFIED REGISTERED

## 2024-10-22 PROCEDURE — 25010000002 MIDAZOLAM PER 1 MG: Performed by: ANESTHESIOLOGY

## 2024-10-22 PROCEDURE — 25010000002 ONDANSETRON PER 1 MG: Performed by: NURSE ANESTHETIST, CERTIFIED REGISTERED

## 2024-10-22 PROCEDURE — 25010000002 FENTANYL CITRATE (PF) 50 MCG/ML SOLUTION: Performed by: NURSE ANESTHETIST, CERTIFIED REGISTERED

## 2024-10-22 PROCEDURE — 88305 TISSUE EXAM BY PATHOLOGIST: CPT | Performed by: STUDENT IN AN ORGANIZED HEALTH CARE EDUCATION/TRAINING PROGRAM

## 2024-10-22 PROCEDURE — 25010000002 MAGNESIUM SULFATE PER 500 MG OF MAGNESIUM: Performed by: NURSE ANESTHETIST, CERTIFIED REGISTERED

## 2024-10-22 PROCEDURE — 25810000003 LACTATED RINGERS PER 1000 ML: Performed by: ANESTHESIOLOGY

## 2024-10-22 PROCEDURE — 25010000002 SUGAMMADEX 200 MG/2ML SOLUTION: Performed by: NURSE ANESTHETIST, CERTIFIED REGISTERED

## 2024-10-22 DEVICE — ARISTA AH ABSORBABLE HEMOSTATIC PARTICLES, 1G BELLOWS CONTAINER
Type: IMPLANTABLE DEVICE | Site: ABDOMEN | Status: FUNCTIONAL
Brand: ARISTA

## 2024-10-22 DEVICE — ABSORBABLE HEMOSTAT (OXIDIZED REGENERATED CELLULOSE, U.S.P.)
Type: IMPLANTABLE DEVICE | Site: ABDOMEN | Status: FUNCTIONAL
Brand: SURGICEL

## 2024-10-22 RX ORDER — FLUMAZENIL 0.1 MG/ML
0.2 INJECTION INTRAVENOUS AS NEEDED
Status: DISCONTINUED | OUTPATIENT
Start: 2024-10-22 | End: 2024-10-22 | Stop reason: HOSPADM

## 2024-10-22 RX ORDER — MAGNESIUM SULFATE HEPTAHYDRATE 500 MG/ML
INJECTION, SOLUTION INTRAMUSCULAR; INTRAVENOUS AS NEEDED
Status: DISCONTINUED | OUTPATIENT
Start: 2024-10-22 | End: 2024-10-22 | Stop reason: SURG

## 2024-10-22 RX ORDER — ACETAMINOPHEN 500 MG
1000 TABLET ORAL EVERY 8 HOURS
Qty: 30 TABLET | Refills: 1 | Status: SHIPPED | OUTPATIENT
Start: 2024-10-22

## 2024-10-22 RX ORDER — HYDRALAZINE HYDROCHLORIDE 20 MG/ML
5 INJECTION INTRAMUSCULAR; INTRAVENOUS
Status: DISCONTINUED | OUTPATIENT
Start: 2024-10-22 | End: 2024-10-22 | Stop reason: HOSPADM

## 2024-10-22 RX ORDER — ONDANSETRON 2 MG/ML
INJECTION INTRAMUSCULAR; INTRAVENOUS AS NEEDED
Status: DISCONTINUED | OUTPATIENT
Start: 2024-10-22 | End: 2024-10-22 | Stop reason: SURG

## 2024-10-22 RX ORDER — DROPERIDOL 2.5 MG/ML
0.62 INJECTION, SOLUTION INTRAMUSCULAR; INTRAVENOUS
Status: DISCONTINUED | OUTPATIENT
Start: 2024-10-22 | End: 2024-10-22 | Stop reason: HOSPADM

## 2024-10-22 RX ORDER — FENTANYL CITRATE 50 UG/ML
50 INJECTION, SOLUTION INTRAMUSCULAR; INTRAVENOUS
Status: DISCONTINUED | OUTPATIENT
Start: 2024-10-22 | End: 2024-10-22 | Stop reason: HOSPADM

## 2024-10-22 RX ORDER — LIDOCAINE HYDROCHLORIDE 10 MG/ML
0.5 INJECTION, SOLUTION INFILTRATION; PERINEURAL ONCE AS NEEDED
Status: DISCONTINUED | OUTPATIENT
Start: 2024-10-22 | End: 2024-10-22 | Stop reason: HOSPADM

## 2024-10-22 RX ORDER — ONDANSETRON 2 MG/ML
4 INJECTION INTRAMUSCULAR; INTRAVENOUS ONCE AS NEEDED
Status: COMPLETED | OUTPATIENT
Start: 2024-10-22 | End: 2024-10-22

## 2024-10-22 RX ORDER — HYDROCODONE BITARTRATE AND ACETAMINOPHEN 5; 325 MG/1; MG/1
1 TABLET ORAL ONCE AS NEEDED
Status: COMPLETED | OUTPATIENT
Start: 2024-10-22 | End: 2024-10-22

## 2024-10-22 RX ORDER — OXYCODONE AND ACETAMINOPHEN 5; 325 MG/1; MG/1
1 TABLET ORAL ONCE AS NEEDED
Status: DISCONTINUED | OUTPATIENT
Start: 2024-10-22 | End: 2024-10-22 | Stop reason: HOSPADM

## 2024-10-22 RX ORDER — EPHEDRINE SULFATE 50 MG/ML
5 INJECTION, SOLUTION INTRAVENOUS ONCE AS NEEDED
Status: DISCONTINUED | OUTPATIENT
Start: 2024-10-22 | End: 2024-10-22 | Stop reason: HOSPADM

## 2024-10-22 RX ORDER — OXYCODONE HYDROCHLORIDE 5 MG/1
5 TABLET ORAL EVERY 6 HOURS PRN
Qty: 12 TABLET | Refills: 0 | Status: SHIPPED | OUTPATIENT
Start: 2024-10-22

## 2024-10-22 RX ORDER — PROPOFOL 10 MG/ML
INJECTION, EMULSION INTRAVENOUS AS NEEDED
Status: DISCONTINUED | OUTPATIENT
Start: 2024-10-22 | End: 2024-10-22 | Stop reason: SURG

## 2024-10-22 RX ORDER — FENTANYL CITRATE 50 UG/ML
50 INJECTION, SOLUTION INTRAMUSCULAR; INTRAVENOUS ONCE AS NEEDED
Status: DISCONTINUED | OUTPATIENT
Start: 2024-10-22 | End: 2024-10-22 | Stop reason: HOSPADM

## 2024-10-22 RX ORDER — HYDROMORPHONE HYDROCHLORIDE 1 MG/ML
0.5 INJECTION, SOLUTION INTRAMUSCULAR; INTRAVENOUS; SUBCUTANEOUS
Status: DISCONTINUED | OUTPATIENT
Start: 2024-10-22 | End: 2024-10-22 | Stop reason: HOSPADM

## 2024-10-22 RX ORDER — MIDAZOLAM HYDROCHLORIDE 1 MG/ML
1 INJECTION INTRAMUSCULAR; INTRAVENOUS
Status: DISCONTINUED | OUTPATIENT
Start: 2024-10-22 | End: 2024-10-22 | Stop reason: HOSPADM

## 2024-10-22 RX ORDER — SODIUM CHLORIDE, SODIUM LACTATE, POTASSIUM CHLORIDE, CALCIUM CHLORIDE 600; 310; 30; 20 MG/100ML; MG/100ML; MG/100ML; MG/100ML
9 INJECTION, SOLUTION INTRAVENOUS CONTINUOUS
Status: DISCONTINUED | OUTPATIENT
Start: 2024-10-22 | End: 2024-10-22 | Stop reason: HOSPADM

## 2024-10-22 RX ORDER — FENTANYL CITRATE 50 UG/ML
INJECTION, SOLUTION INTRAMUSCULAR; INTRAVENOUS AS NEEDED
Status: DISCONTINUED | OUTPATIENT
Start: 2024-10-22 | End: 2024-10-22 | Stop reason: SURG

## 2024-10-22 RX ORDER — FAMOTIDINE 10 MG/ML
20 INJECTION, SOLUTION INTRAVENOUS ONCE
Status: COMPLETED | OUTPATIENT
Start: 2024-10-22 | End: 2024-10-22

## 2024-10-22 RX ORDER — OXYCODONE AND ACETAMINOPHEN 7.5; 325 MG/1; MG/1
1 TABLET ORAL EVERY 4 HOURS PRN
Status: DISCONTINUED | OUTPATIENT
Start: 2024-10-22 | End: 2024-10-22 | Stop reason: HOSPADM

## 2024-10-22 RX ORDER — EPHEDRINE SULFATE 50 MG/ML
INJECTION INTRAVENOUS AS NEEDED
Status: DISCONTINUED | OUTPATIENT
Start: 2024-10-22 | End: 2024-10-22 | Stop reason: SURG

## 2024-10-22 RX ORDER — PROMETHAZINE HYDROCHLORIDE 25 MG/1
25 SUPPOSITORY RECTAL ONCE AS NEEDED
Status: DISCONTINUED | OUTPATIENT
Start: 2024-10-22 | End: 2024-10-22 | Stop reason: HOSPADM

## 2024-10-22 RX ORDER — SODIUM CHLORIDE 0.9 % (FLUSH) 0.9 %
3-10 SYRINGE (ML) INJECTION AS NEEDED
Status: DISCONTINUED | OUTPATIENT
Start: 2024-10-22 | End: 2024-10-22 | Stop reason: HOSPADM

## 2024-10-22 RX ORDER — DIPHENHYDRAMINE HYDROCHLORIDE 50 MG/ML
12.5 INJECTION INTRAMUSCULAR; INTRAVENOUS
Status: DISCONTINUED | OUTPATIENT
Start: 2024-10-22 | End: 2024-10-22 | Stop reason: HOSPADM

## 2024-10-22 RX ORDER — IPRATROPIUM BROMIDE AND ALBUTEROL SULFATE 2.5; .5 MG/3ML; MG/3ML
3 SOLUTION RESPIRATORY (INHALATION) ONCE AS NEEDED
Status: DISCONTINUED | OUTPATIENT
Start: 2024-10-22 | End: 2024-10-22 | Stop reason: HOSPADM

## 2024-10-22 RX ORDER — LABETALOL HYDROCHLORIDE 5 MG/ML
5 INJECTION, SOLUTION INTRAVENOUS
Status: DISCONTINUED | OUTPATIENT
Start: 2024-10-22 | End: 2024-10-22 | Stop reason: HOSPADM

## 2024-10-22 RX ORDER — ROCURONIUM BROMIDE 10 MG/ML
INJECTION, SOLUTION INTRAVENOUS AS NEEDED
Status: DISCONTINUED | OUTPATIENT
Start: 2024-10-22 | End: 2024-10-22 | Stop reason: SURG

## 2024-10-22 RX ORDER — KETOROLAC TROMETHAMINE 30 MG/ML
INJECTION, SOLUTION INTRAMUSCULAR; INTRAVENOUS AS NEEDED
Status: DISCONTINUED | OUTPATIENT
Start: 2024-10-22 | End: 2024-10-22 | Stop reason: SURG

## 2024-10-22 RX ORDER — DEXAMETHASONE SODIUM PHOSPHATE 4 MG/ML
INJECTION, SOLUTION INTRA-ARTICULAR; INTRALESIONAL; INTRAMUSCULAR; INTRAVENOUS; SOFT TISSUE AS NEEDED
Status: DISCONTINUED | OUTPATIENT
Start: 2024-10-22 | End: 2024-10-22 | Stop reason: SURG

## 2024-10-22 RX ORDER — BUPIVACAINE HYDROCHLORIDE AND EPINEPHRINE 2.5; 5 MG/ML; UG/ML
INJECTION, SOLUTION EPIDURAL; INFILTRATION; INTRACAUDAL; PERINEURAL AS NEEDED
Status: DISCONTINUED | OUTPATIENT
Start: 2024-10-22 | End: 2024-10-22 | Stop reason: HOSPADM

## 2024-10-22 RX ORDER — PROMETHAZINE HYDROCHLORIDE 25 MG/1
25 TABLET ORAL ONCE AS NEEDED
Status: DISCONTINUED | OUTPATIENT
Start: 2024-10-22 | End: 2024-10-22 | Stop reason: HOSPADM

## 2024-10-22 RX ORDER — SODIUM CHLORIDE 0.9 % (FLUSH) 0.9 %
3 SYRINGE (ML) INJECTION EVERY 12 HOURS SCHEDULED
Status: DISCONTINUED | OUTPATIENT
Start: 2024-10-22 | End: 2024-10-22 | Stop reason: HOSPADM

## 2024-10-22 RX ORDER — LIDOCAINE HYDROCHLORIDE 20 MG/ML
INJECTION, SOLUTION INFILTRATION; PERINEURAL AS NEEDED
Status: DISCONTINUED | OUTPATIENT
Start: 2024-10-22 | End: 2024-10-22 | Stop reason: SURG

## 2024-10-22 RX ORDER — NALOXONE HCL 0.4 MG/ML
0.2 VIAL (ML) INJECTION AS NEEDED
Status: DISCONTINUED | OUTPATIENT
Start: 2024-10-22 | End: 2024-10-22 | Stop reason: HOSPADM

## 2024-10-22 RX ADMIN — LIDOCAINE HYDROCHLORIDE 100 MG: 20 INJECTION, SOLUTION INFILTRATION; PERINEURAL at 12:04

## 2024-10-22 RX ADMIN — EPHEDRINE SULFATE 10 MG: 50 INJECTION INTRAVENOUS at 12:22

## 2024-10-22 RX ADMIN — DEXAMETHASONE SODIUM PHOSPHATE 8 MG: 4 INJECTION, SOLUTION INTRAMUSCULAR; INTRAVENOUS at 12:08

## 2024-10-22 RX ADMIN — EPHEDRINE SULFATE 10 MG: 50 INJECTION INTRAVENOUS at 13:47

## 2024-10-22 RX ADMIN — ROCURONIUM BROMIDE 50 MG: 10 INJECTION, SOLUTION INTRAVENOUS at 12:04

## 2024-10-22 RX ADMIN — SODIUM CHLORIDE, POTASSIUM CHLORIDE, SODIUM LACTATE AND CALCIUM CHLORIDE 9 ML/HR: 600; 310; 30; 20 INJECTION, SOLUTION INTRAVENOUS at 10:56

## 2024-10-22 RX ADMIN — SUGAMMADEX 200 MG: 100 INJECTION, SOLUTION INTRAVENOUS at 13:54

## 2024-10-22 RX ADMIN — MIDAZOLAM 1 MG: 1 INJECTION INTRAMUSCULAR; INTRAVENOUS at 11:40

## 2024-10-22 RX ADMIN — FENTANYL CITRATE 25 MCG: 50 INJECTION, SOLUTION INTRAMUSCULAR; INTRAVENOUS at 12:19

## 2024-10-22 RX ADMIN — HYDROMORPHONE HYDROCHLORIDE 0.5 MG: 1 INJECTION, SOLUTION INTRAMUSCULAR; INTRAVENOUS; SUBCUTANEOUS at 13:34

## 2024-10-22 RX ADMIN — FAMOTIDINE 20 MG: 10 INJECTION INTRAVENOUS at 10:55

## 2024-10-22 RX ADMIN — PROPOFOL 150 MG: 10 INJECTION, EMULSION INTRAVENOUS at 12:04

## 2024-10-22 RX ADMIN — ROCURONIUM BROMIDE 10 MG: 10 INJECTION, SOLUTION INTRAVENOUS at 12:50

## 2024-10-22 RX ADMIN — FENTANYL CITRATE 25 MCG: 50 INJECTION, SOLUTION INTRAMUSCULAR; INTRAVENOUS at 12:04

## 2024-10-22 RX ADMIN — ONDANSETRON 4 MG: 2 INJECTION INTRAMUSCULAR; INTRAVENOUS at 13:45

## 2024-10-22 RX ADMIN — ONDANSETRON 4 MG: 2 INJECTION, SOLUTION INTRAMUSCULAR; INTRAVENOUS at 14:57

## 2024-10-22 RX ADMIN — KETOROLAC TROMETHAMINE 30 MG: 30 INJECTION, SOLUTION INTRAMUSCULAR at 13:45

## 2024-10-22 RX ADMIN — MAGNESIUM SULFATE HEPTAHYDRATE 2 G: 500 INJECTION, SOLUTION INTRAMUSCULAR; INTRAVENOUS at 12:08

## 2024-10-22 RX ADMIN — HYDROCODONE BITARTRATE AND ACETAMINOPHEN 1 TABLET: 5; 325 TABLET ORAL at 15:56

## 2024-10-22 RX ADMIN — PROPOFOL 25 MCG/KG/MIN: 10 INJECTION, EMULSION INTRAVENOUS at 12:08

## 2024-10-22 NOTE — ANESTHESIA PROCEDURE NOTES
Airway  Urgency: elective    Date/Time: 10/22/2024 12:07 PM  Airway not difficult    General Information and Staff    Patient location during procedure: OR  CRNA/CAA: Amanda Walton CRNA    Indications and Patient Condition  Indications for airway management: airway protection    Preoxygenated: yes      Final Airway Details  Final airway type: endotracheal airway      Successful airway: ETT  Cuffed: yes   Successful intubation technique: direct laryngoscopy  Facilitating devices/methods: intubating stylet  Endotracheal tube insertion site: oral  Blade: Mary  Blade size: 3  ETT size (mm): 7.0  Cormack-Lehane Classification: grade I - full view of glottis  Placement verified by: chest auscultation and capnometry   Cuff volume (mL): 6  Measured from: lips  Number of attempts at approach: 1  Assessment: lips, teeth, and gum same as pre-op and atraumatic intubation

## 2024-10-22 NOTE — H&P
[facility]  H&P Note    Patient Identification:  Name: Swetha Jones  Age: 39 y.o.  Sex: female  :  1985  MRN: [id number]                       Chief Complaint:  Scheduled surgery    History of Present Illness:   Swetha Jones is a 39 y.o.  female who presents for scheduled diagnostic laparoscopy, possible resection of endometriosis, and chromopertubation for evaluation of pelvic pain and tubal patency. The patient has a long-standing history of constant pelvic pain that is worsened by her periods and ovulation. She has tried birth control with slight improvement but then had side effects from this medication. She would like to proceed with planned procedure to evaluate for endometriosis and possibly find cause of pelvic pain.     Past Medical History:  Past Medical History:   Diagnosis Date    ADHD     Anxiety and depression     Arthritis     Bilateral ovarian cysts     Chronic constipation     Crampy pain associated with menses     Difficulty swallowing pills     Diverticular disease     Dysmenorrhea     Dysphagia     Eczema     Eosinophilic esophagitis     Fatigue     Fatty liver     Fibromyalgia     GERD (gastroesophageal reflux disease)     Heavy menses     Hemorrhoids     Hiatal hernia     Large granular lymphocyte disorder     Melanoma     Migraine     Multiple sclerosis     receives IV infusions    Neck pain     Obesity, Class III, BMI 40-49.9 (morbid obesity)     OCD (obsessive compulsive disorder)     SAVANNAH (obstructive sleep apnea)     mild no machine    Paraesophageal hernia     Port-A-Cath in place     right side chest- FOR MS TREATMENTS EVERY 6 WEEKS    Rosacea     Splenomegaly     Trigeminal neuralgia      Past Surgical History:  Past Surgical History:   Procedure Laterality Date    BONE MARROW ASPIRATE AND BIOPSY WIITH LUMBAR PUNCTURE      COLONOSCOPY      COLPOSCOPY W/ BIOPSY / CURETTAGE  2013    ENDOSCOPY  08/10/2023    dx eosinophilic esophagitis     GASTRIC SLEEVE LAPAROSCOPIC N/A 12/13/2023    Procedure: GASTRIC SLEEVE LAPAROSCOPIC with paraesophageal Hernia Repair;  Surgeon: Eliel Acosta Jr., MD;  [place] [facility] [place] [facility];  Service: Bariatric;  Laterality: N/A;    MOHS SURGERY      melanoma right shoulder    PORTACATH PLACEMENT  2016    TYMPANOSTOMY Bilateral     1988,1991,1994      Home Meds:  Medications Prior to Admission   Medication Sig Dispense Refill Last Dose/Taking    acetaminophen (TYLENOL) 500 MG tablet Take 2 tablets by mouth Every 6 (Six) Hours As Needed for Mild Pain.   Past Week    Calcium 500-2.5 MG-MCG chewable tablet Chew 1 tablet 3 times a day. HOLD FOR SURGERY   Past Month    levocetirizine (XYZAL) 5 MG tablet Take 1 tablet by mouth Every Evening.   Past Week    linaclotide (LINZESS) 145 MCG capsule capsule Take 1 capsule by mouth Daily.   10/21/2024    MAGNESIUM ASPARTATE PO Take 1 tablet by mouth Daily. HOLD FOR SURGERY   Past Month    melatonin 5 MG sublingual tablet sublingual tablet Place 1 tablet under the tongue At Night As Needed.   Past Week    multivitamin with minerals (Multivitamin Adult) tablet tablet Take 1 tablet by mouth Daily. BARIATRIC SPECIFIC- HOLD FOR SURGERY   Past Month    OXcarbazepine (TRILEPTAL) 150 MG tablet Take 1 tablet by mouth 2 (Two) Times a Day As Needed.   Past Month    lisdexamfetamine (VYVANSE) 50 MG capsule Take 1 capsule by mouth Daily HOLD FOR 48 HOURS PRIOR TO SURGERY 30 capsule 2 8/29/2024    meclizine (ANTIVERT) 25 MG tablet Take 1 tablet by mouth 3 (Three) Times a Day As Needed for Dizziness or Nausea.   More than a month    natalizumab (TYSABRI) 300 MG/15ML injection Infuse 15 mL into a venous catheter As Needed (every 42 days). MD office   9/19/2024    traMADol (Ultram) 50 MG tablet Take 1 tablet by mouth Every 6 (Six) Hours As Needed for Severe Pain. 15 tablet 0 More than a month     Current Meds:   Current Facility-Administered Medications   Medication Dose Route Frequency Provider Last  Rate Last Admin    fentaNYL citrate (PF) (SUBLIMAZE) injection 50 mcg  50 mcg Intravenous Once PRN Melany oM MD        lactated ringers infusion  9 mL/hr Intravenous Continuous Melany Mo MD 9 mL/hr at 10/22/24 1056 9 mL/hr at 10/22/24 1056    lidocaine (XYLOCAINE) 1 % injection 0.5 mL  0.5 mL Intradermal Once PRN Melany Mo MD        midazolam (VERSED) injection 1 mg  1 mg Intravenous Q5 Min PRN Melany Mo MD   1 mg at 10/22/24 1140    sodium chloride 0.9 % flush 3 mL  3 mL Intravenous Q12H Melany Mo MD        sodium chloride 0.9 % flush 3-10 mL  3-10 mL Intravenous PRN Melany Mo MD           Allergies:  Allergies   Allergen Reactions    Shellfish Allergy Nausea And Vomiting, Rash and Dizziness    Latex Rash    Soybean-Containing Drug Products Swelling     Throat starts to swell, hard time swallowing.     Immunizations:  Immunization History   Administered Date(s) Administered    Hep B, Adolescent or Pediatric 01/09/2003    Td (TDVAX) 01/09/2003     Social History:   Social History     Tobacco Use    Smoking status: Never    Smokeless tobacco: Never   Substance Use Topics    Alcohol use: Never      Family History:  Family History   Problem Relation Age of Onset    Sleep apnea Mother     Ovarian cancer Mother     OCD Mother     Hepatitis Mother     Obesity Father     Sleep apnea Father     Diabetes Father     Heart disease Father     Hepatitis Father     Obesity Sister     Bipolar disorder Sister     Heart disease Brother     Sleep apnea Brother     Diabetes Brother     Obesity Brother     Hypertension Brother     Heart attack Brother 37    Sleep apnea Maternal Grandmother     Heart disease Maternal Grandmother     Heart attack Maternal Grandmother     Hypertension Maternal Grandmother     Diabetes Maternal Grandmother     Throat cancer Maternal Grandfather     Hypertension Paternal Grandmother     Hypertension  Paternal Grandfather     Lung cancer Paternal Grandfather     Malig Hyperthermia Neg Hx         Review of Systems  Pertinent items are noted in HPI.    Objective:  tMax 24 hrs: Temp (24hrs), Av.3 °F (36.8 °C), Min:98.3 °F (36.8 °C), Max:98.3 °F (36.8 °C)    Vitals Ranges:   Temp:  [98.3 °F (36.8 °C)] 98.3 °F (36.8 °C)  Heart Rate:  [72] 72  Resp:  [18] 18  BP: (111)/(62) 111/62  Intake and Output Last 3 Shifts:   No intake/output data recorded.    Exam:     General Appearance:    Alert, cooperative, no distress, appears stated age   Head:    Normocephalic, without obvious abnormality, atraumatic   Back:     Symmetric, no curvature, ROM normal   Lungs:     No increased work of breathing, regular respirations    Abdomen:     Soft, non-tender, bowel sounds active all four quadrants,     no masses, no organomegaly   Extremities:   Extremities normal, atraumatic, no cyanosis or edema   Skin:   Skin color, texture, turgor normal, no rashes or lesions       Data Review:  CBC          2024    15:13 2024    15:36 10/8/2024    15:38   CBC   WBC 6.96     6.65     6.17    RBC 4.89     4.16     4.62    Hemoglobin 13.9     12.0     13.3    Hematocrit 42.0     35.5     41.5    MCV 85.9     85.3     89.8    MCH 28.4     28.8     28.8    MCHC 33.1     33.8     32.0    RDW 14.4     14.1     13.2    Platelets 233     227     241       Details          This result is from an external source.             BMP          2023    15:17 2024    00:25 10/8/2024    15:38   BMP   BUN 12  11  21    Creatinine 0.80  0.81  0.78    Sodium 137  140  137    Potassium 4.1  3.8  4.6    Chloride 101  101  104    CO2 25.0  25.4  24.0    Calcium 9.3  9.9  9.9      HCG negative today     Assessment:    Pelvic pain      Plan:  Reviewed planned procedure of diagnostic laparoscopy, possible resection of endometriosis, and chromopertubation. Discussed risks including but not limited to bleeding, infection, damage to surrounding  structures that may require additional surgery. Consents signed. Antibiotics not indicated. SCDs ordered for DVT prophylaxis. Will proceed to the OR for planned procedure. Reviewed discharge instructions and call precautions. Follow up in 2 weeks for postop visit.     Jayde Armstrong MD  10/22/2024

## 2024-10-22 NOTE — ANESTHESIA POSTPROCEDURE EVALUATION
Patient: Swetha Jones    Procedure Summary       Date: 10/22/24 Room / Location: [facility] OR  / [facility] MAIN OR    Anesthesia Start: 1156 Anesthesia Stop: 1408    Procedure: DIAGNOSTIC LAPAROSCOPY, RESECTION OF ENDOMETRIOSIS, CHROMOPERTUBATION (Abdomen) Diagnosis:       Pelvic pain      (Pelvic pain [R10.2])    Surgeons: Jayde Armstrong MD Provider: Luke Senior MD    Anesthesia Type: general ASA Status: 3            Anesthesia Type: general    Vitals  Vitals Value Taken Time   /60 10/22/24 1615   Temp 36.6 °C (97.8 °F) 10/22/24 1405   Pulse 70 10/22/24 1620   Resp 16 10/22/24 1500   SpO2 98 % 10/22/24 1620   Vitals shown include unfiled device data.        Anesthesia Post Evaluation

## 2024-10-22 NOTE — OP NOTE
Operative Note    Date of Surgery: 10/22/2024  Pre-op Diagnosis: Pelvic pain  Post-op Diagnosis: Pelvic pain   Procedure: Diagnostic laparoscopy, Resection of endometriosis, Chromopertubation   Surgeon: Jayde Armstrong MD   Assistant: scrub tech  Anesthesia: GETA  EBL: 10 mL  Complications: none     Findings: normal appearing uterine fundus, tubes and ovaries; bilateral fallopian tubes noted to be patent when methylene blue dye solution was injected via chromopertubation, normal gallbladder and liver, normal appearing appendix. Evaluation of the peritoneum noted windows in the posterior cul de sac, hyperemia of the peritoneum with scattered clear and light brown lesions of the right and left ovarian fossa, light brown-yellow lesion on the left pelvic side wall.     Indications:  Swetha Jones is a 39 y.o.  female who presented for diagnostic laparoscopy, possible resection of endometriosis, chrombopertubation for management of long standing pelvic pain.  The risks of the procedure were explained including but not limited to possible injury to internal organs and intra-abdominal bleeding. The patient agreed to the procedure and signed the consent form.     Procedure:   Patient was taken to the OR where she was placed under general anesthesia.  She was then positioned in dorsal lithotomy and prepped and draped in the normal sterile fashion.  An exam under anesthesia revealed a normal sized, anteverted uterus and no adnexal masses appreciated.  The bladder was drained with an in and out garza catheter.  A timeout was performed.  An open-sided speculum was placed in the vagina, and the anterior lip of the cervix was grasped with a tenaculum.  The K-JEROME uterine manipulator was placed.  The uterine manipulator was placed without difficulty. Attention was then turned to the abdomen.  A total of 16 mL 0.25% marcaine was injected at the sites of the laparoscopic incisions.  An infraumbilical incision was made with a  scalpel and carried down to the fascia. The fascia was grasped with Kocher clamps, elevated and incised with Silva scissors. The posterior sheath and peritoneum were then identified, grasped with Arely clamps, elevated, and incised with Metzenbaum scissors, ensure no bowel was injured and the intraperitoneal cavity was entered. Stay sutures using 0 Vicryl were then placed on the fascia. A 12 mm Baylee trocar was then placed in the incision. The abdomen was insufflated with an opening pressure of 6 mm Hg.  Following insufflation the omentum immediately under the trocar was inspected and noted to be normal.  The patient was placed in Trendelenburg.  Intra-abdominal and pelvic survey revealed the aforementioned findings. A 5 mm trocar was placed in the left lower quadrant and just inferior to the level of the umbilicus on the midclavicular line under direct visualization. The peritoneum was the elevated from the right ovarian fossa and incised with laparoscopic scissors and blunt dissection to remove suspected endometriotic lesions with care taken to observe location of the ureter. The peritoneum lateral to the left uterosacral ligament and in the left ovarian fossa was then excised using laparoscopic scissors and blunt dissection with care taken to observe where the location of the ureter was. Next, the peritoneum of the left pelvic side wall above the sigmoid colon and superior to the round ligament using laparoscopic scissors and blunt dissection. Monopolar coagulation was used to achieve hemostasis along this area. The areas in the ovarian fossa were still oozing and Valente was applied to both areas. Surgicel was then placed in the left ovarian fossa where the peritoneum was excised. The pneumoperitoneum was then decreased and no bleeding noted. The patient was then taken out of Trendelenburg and the pneumoperitoneum was released.  The fascia at the 12 mm incision was closed with 0 Vicryl in running fashion. The  skin incisions were closed with a 4-0 monocryl. The uterine manipulator was removed and hemostasis was noted. The patient was then positioned supine and awakened from general anesthesia.  She was transferred to PACU in good condition.  The patient tolerated the procedure well. Instrument, sponge and needle counts were reported to be correct. The patient was taken to the recovery room in stable condition.     Jayde Armstrong MD

## 2024-10-22 NOTE — ANESTHESIA PREPROCEDURE EVALUATION
Anesthesia Evaluation     Patient summary reviewed and Nursing notes reviewed   no history of anesthetic complications:   NPO Solid Status: > 8 hours  NPO Liquid Status: > 2 hours           Airway   Mallampati: II  Dental - normal exam     Pulmonary    (+) ,sleep apnea  Cardiovascular - negative cardio ROS  Exercise tolerance: good (4-7 METS)    ECG reviewed  Rhythm: regular      ROS comment:  NORMAL ECG -  Sinus rhythm  No change from previous tracing  Electronically Signed By: Leda Maya ([facility]) 2024-10-08 16:26:42      Neuro/Psych  (+) headaches, dizziness/light headedness, weakness, numbness, psychiatric history ADD and Depression    ROS Comment: Ehler-danlos sx  MS  GI/Hepatic/Renal/Endo    (+) obesity, hiatal hernia, GERD, liver disease fatty liver disease    Musculoskeletal     (+) back pain, myalgias, neck pain  Abdominal   (+) obese   Substance History - negative use     OB/GYN negative ob/gyn ROS         Other   arthritis, blood dyscrasia,   history of cancer                  Anesthesia Plan    ASA 3     general     (/62 (BP Location: Right arm, Patient Position: Lying)   Pulse 72   Temp 36.8 °C (98.3 °F) (Oral)   Resp 18   LMP 09/18/2024   SpO2 95%     I have reviewed the patient's history with the patient and the chart, including all pertinent laboratory results and imaging. I have explained the risks of anesthesia including but not limited to dental damage, corneal abrasion, nerve injury, MI, stroke, and death.    )  intravenous induction     Anesthetic plan, risks, benefits, and alternatives have been provided, discussed and informed consent has been obtained with: patient.    CODE STATUS:

## 2024-10-22 NOTE — DISCHARGE INSTRUCTIONS
Discharge instructions reviewed include:  - contact MD Office with increasing pain, fever (temp of 100.4 or greater), nausea/vomiting, heavy vaginal bleeding, or other concerning symptom  - if you are having a medical emergency, go to the Emergency Department  - vaginal bleeding/spotting is normal, but should get lighter over the next 2-3 days  - mild to moderate cramping is expected.    - shoulder pain may occur; this is typical of laparoscopic surgery.  - do not take more than one type of NSAID (such as mobic, ibuprofen, naproxen).  You may alternate an NSAID and Tylenol (acetaminophen) but do not take more than 4000mg (4g) of Tylenol (acetaminophen) in 24 hours.  If you choose to not take narcotic pain medication, I recommend alternating ibuprofen 600mg every 6 hours and tylenol (acetaminophen) 500mg every 6 hours.  - do not lift anything over 10 lbs for 2 weeks         YOU WILL BE ON PELVIC REST FOR THE NEXT 2 WEEKS OR UNTIL SPECIFIED BY YOUR PHYSICIAN. PELVIC REST INCLUDES:  Avoiding long periods of standing.  Avoiding heavy lifting, pushing or pulling.  DO NOT lift anything heavier than 10 pounds (4.5 kg)  DO NOT douche, use tampons, or have sex (intercourse) for 2 weeks after the procedure.    SEEK MEDICAL CARE IF:    You have increasing cramps or pain that is not relieved with medicine.  You have bad smelling vaginal discharge.  You are having problems with any medicine.  You have bleeding that is heavier than a normal menstrual period (greater than 1 pad/hour) or you notice large clots.  You have a fever > 101.  You have chest pain or shortness of breath.

## 2024-10-23 LAB
CYTO UR: NORMAL
LAB AP CASE REPORT: NORMAL
PATH REPORT.FINAL DX SPEC: NORMAL
PATH REPORT.GROSS SPEC: NORMAL

## 2024-10-28 ENCOUNTER — TELEPHONE (OUTPATIENT)
Dept: OBSTETRICS AND GYNECOLOGY | Facility: CLINIC | Age: 39
End: 2024-10-28
Payer: COMMERCIAL

## 2024-10-28 NOTE — TELEPHONE ENCOUNTER
Left message for patient to call office or check MyChart message:   Dr. Armstrong has an appointment that just opened for Monday, November 4th at 8:45 at the [facility] location.  I am going to put you on the schedule to hold that spot for you. If that does not work for you, please call the office at [phone]. Option 4. Otherwise, we will see you on Monday.

## 2024-10-28 NOTE — TELEPHONE ENCOUNTER
Hub staff attempted to follow warm transfer process and was unsuccessful     Caller: Swetha Jones    Relationship to patient: Self    Best call back number: [phone] CALL ANYTIME. IT IS OKAY TO LVM.    Patient is needing: PATIENT CALLED TO SCHEDULE POST-OP APPT. DISCHARGE NOTE:Follow up with Jayde Armstrong MD (Obstetrics and Gynecology) in 2 weeks (11/5/2024); as directed. [facility] AVAILABLE WITH  IS 12/04/24.   IF POSSIBLE, PATIENT PREFERS [facility] OFFICE AFTER 11:00 AM.

## 2024-11-04 ENCOUNTER — OFFICE VISIT (OUTPATIENT)
Dept: OBSTETRICS AND GYNECOLOGY | Facility: CLINIC | Age: 39
End: 2024-11-04
Payer: COMMERCIAL

## 2024-11-04 VITALS
BODY MASS INDEX: 28.83 KG/M2 | DIASTOLIC BLOOD PRESSURE: 71 MMHG | HEART RATE: 88 BPM | WEIGHT: 179.4 LBS | HEIGHT: 66 IN | SYSTOLIC BLOOD PRESSURE: 116 MMHG

## 2024-11-04 DIAGNOSIS — Z98.890 S/P LAPAROSCOPY: Primary | ICD-10-CM

## 2024-11-04 DIAGNOSIS — R10.2 PELVIC PAIN: ICD-10-CM

## 2024-11-04 PROCEDURE — 99024 POSTOP FOLLOW-UP VISIT: CPT | Performed by: STUDENT IN AN ORGANIZED HEALTH CARE EDUCATION/TRAINING PROGRAM

## 2024-11-04 NOTE — PROGRESS NOTES
Chief Complaint   Patient presents with    Gynecologic Exam     Pt is here today to discuss surgery findings (10/22/24)    Post-op      Swetha Jones is a 39 y.o. female who presents to the clinic 2 weeks status post diagnostic laparoscopy, resection of possible endometriosis, chromopertubation on 10/22/24 for evaluation of pelvic pain. The patient reports doing well today. She has had some tenderness at her incisions sites and feels like she pulled a muscle when she tried to lift something. She reports normal bowel and bladder function currently. She is tolerating a regular diet. Denies vaginal bleeding.   She is on Tysabri injections for management of MS and should not get pregnant while taking.  She reports family history of endometriosis and infertility.  She has never tried ovulation induction agents. She has ANNIE coverage for at least one round.   The pelvic pain she experiences is most days but significantly worse with periods and her partner notices. She cannot take ibuprofen given history of gastric sleeve surgery. It did use to help.     Past Medical History:   Diagnosis Date    ADHD     Anxiety and depression     Arthritis     Bilateral ovarian cysts     Chronic constipation     Crampy pain associated with menses     Difficulty swallowing pills     Diverticular disease     Dysmenorrhea     Dysphagia     Eczema     Eosinophilic esophagitis     Fatigue     Fatty liver     Fibromyalgia     GERD (gastroesophageal reflux disease)     Heavy menses     Hemorrhoids     Hiatal hernia     Large granular lymphocyte disorder     Melanoma     Migraine     Multiple sclerosis     receives IV infusions    Neck pain     Obesity, Class III, BMI 40-49.9 (morbid obesity)     OCD (obsessive compulsive disorder)     SAVANNAH (obstructive sleep apnea)     mild no machine    Paraesophageal hernia     Port-A-Cath in place     right side chest- FOR MS TREATMENTS EVERY 6 WEEKS    Rosacea     Splenomegaly     Trigeminal neuralgia       Past Surgical History:   Procedure Laterality Date    BONE MARROW ASPIRATE AND BIOPSY WIITH LUMBAR PUNCTURE  2020    COLONOSCOPY  2020    COLPOSCOPY W/ BIOPSY / CURETTAGE  2013    DIAGNOSTIC LAPAROSCOPY N/A 10/22/2024    Procedure: DIAGNOSTIC LAPAROSCOPY, RESECTION OF ENDOMETRIOSIS, CHROMOPERTUBATION;  Surgeon: Jayde Armstrong MD;  Location: [facility] OR;  Service: Obstetrics/Gynecology;  Laterality: N/A;    ENDOSCOPY  08/10/2023    dx eosinophilic esophagitis    GASTRIC SLEEVE LAPAROSCOPIC N/A 12/13/2023    Procedure: GASTRIC SLEEVE LAPAROSCOPIC with paraesophageal Hernia Repair;  Surgeon: Eliel Acosta Jr., MD;  [place] [facility] [place] [facility] [place]  Service: Bariatric;  Laterality: N/A;    MOHS SURGERY      melanoma right shoulder    PORTACATH PLACEMENT  2016    TYMPANOSTOMY Bilateral     1988,1991,1994     Social History     Tobacco Use    Smoking status: Never    Smokeless tobacco: Never   Vaping Use    Vaping status: Never Used   Substance Use Topics    Alcohol use: Never    Drug use: Never     Family History   Problem Relation Age of Onset    Sleep apnea Mother     Ovarian cancer Mother     OCD Mother     Hepatitis Mother     Obesity Father     Sleep apnea Father     Diabetes Father     Heart disease Father     Hepatitis Father     Obesity Sister     Bipolar disorder Sister     Heart disease Brother     Sleep apnea Brother     Diabetes Brother     Obesity Brother     Hypertension Brother     Heart attack Brother 37    Sleep apnea Maternal Grandmother     Heart disease Maternal Grandmother     Heart attack Maternal Grandmother     Hypertension Maternal Grandmother     Diabetes Maternal Grandmother     Throat cancer Maternal Grandfather     Hypertension Paternal Grandmother     Hypertension Paternal Grandfather     Lung cancer Paternal Grandfather     Malig Hyperthermia Neg Hx          Review of Systems   Constitutional:  Negative for chills and fever.   Gastrointestinal:  Positive for abdominal pain. Negative  "for constipation and diarrhea.   Genitourinary:  Positive for menstrual problem and pelvic pain. Negative for vaginal bleeding and vaginal pain.       OBJECTIVE:   Vitals:    11/04/24 0847   BP: 116/71   Pulse: 88   Weight: 81.4 kg (179 lb 6.4 oz)   Height: 167.6 cm (65.98\")        Physical Exam  Vitals reviewed.   Constitutional:       General: She is not in acute distress.  HENT:      Head: Normocephalic and atraumatic.      Right Ear: External ear normal.      Left Ear: External ear normal.   Eyes:      Extraocular Movements: Extraocular movements intact.      Pupils: Pupils are equal, round, and reactive to light.   Pulmonary:      Effort: Pulmonary effort is normal. No respiratory distress.   Abdominal:      General: There is no distension.      Palpations: Abdomen is soft. There is no mass.      Tenderness: There is abdominal tenderness. There is no guarding or rebound.       Musculoskeletal:         General: No deformity. Normal range of motion.   Skin:     General: Skin is warm and dry.   Neurological:      General: No focal deficit present.      Mental Status: She is alert and oriented to person, place, and time.   Psychiatric:         Mood and Affect: Mood normal.         Behavior: Behavior normal.         ASSESSMENT:  S/p diagnostic laparoscopy, resection of possible endometriosis, chromopertubation  Pelvic pain    PLAN:   Patient's incisions are healing well and recommended gradual return to normal activities including lifting, exercise, and sexual activity.   Reviewed operative findings and pathology findings that noted no endometriosis and no obvious areas of endometriosis on laparoscopy.   We discussed that her pelvic pain could be primary dysmenorrhea that is more centralized given her MS. We discussed options for management including Orlissa that may help reduce pain of her periods. She may consider.  Patient does desire future fertility and she will help to time stopping her Tysabri and bridging " medication. She can then try ovulation induction agent since she and her partner have been trying to conceive for years without success. We discussed that she has patent fallopian tubes and is likely ovulating regularly given regular periods but we can try to enhance this with ovulation induction for several cycles and if this does not work then she should proceed with ANNIE via IVF.   All questions and concerns answered. Patient will consider options as discussed above.  Follow up as needed.     Jayde Armstrong MD

## 2024-12-20 ENCOUNTER — OFFICE VISIT (OUTPATIENT)
Dept: BARIATRICS/WEIGHT MGMT | Facility: CLINIC | Age: 39
End: 2024-12-20
Payer: COMMERCIAL

## 2024-12-20 VITALS
WEIGHT: 171 LBS | HEIGHT: 65 IN | SYSTOLIC BLOOD PRESSURE: 113 MMHG | BODY MASS INDEX: 28.49 KG/M2 | DIASTOLIC BLOOD PRESSURE: 72 MMHG | TEMPERATURE: 98.1 F | HEART RATE: 90 BPM

## 2024-12-20 DIAGNOSIS — E66.3 OVERWEIGHT WITH BODY MASS INDEX (BMI) 25.0-29.9: Primary | ICD-10-CM

## 2024-12-20 DIAGNOSIS — Z98.84 S/P LAPAROSCOPIC SLEEVE GASTRECTOMY: ICD-10-CM

## 2024-12-20 DIAGNOSIS — Z71.3 DIETARY COUNSELING: ICD-10-CM

## 2024-12-20 PROBLEM — H04.169: Status: ACTIVE | Noted: 2024-12-11

## 2024-12-20 NOTE — PROGRESS NOTES
[facility]  [address]  [place] [id number] [phone]  [address]  [place] [id number] [phone]  Dept: [phone]  12/20/2024      Swetha Jones.  [id number]  [id number]  1985  female      Chief Complaint   Patient presents with    Follow-up     1 year fup sleeve       BH Post-Op Bariatric Surgery:   Swetha Jones is status post Laparoscopic Sleeve/PEHR procedure, performed on 12/13/2023     HPI:   Today's weight is 77.6 kg (171 lb) pounds, today's BMI is Body mass index is 28.28 kg/m²., has a  loss of 8 pounds since the last visit and weight loss since surgery is 96 pounds. The patient reports a decreased portion size and loss of appetite.      Swetha Jones denies n/v/r/d and reports tolerating regular diet.  Had diagnostic lap to remove endometriosis.  Wasn't endometriosis but was Rivera masters windows.    Had lacrimal duct surgery and was told diagnostic for Sjogren's.  Has had some issues with low blood sugar.  2 times in last 3 months.  Last time happened ate 45 mintues prior to taht.  Can't figure out what food or issue is causing.  Does eat some carbs.  Does have some potatoes.        Diet and Exercise: Diet history reviewed and discussed with the patient. Weight loss/gains to date discussed with the patient. The patient states they are eating 120 grams of protein per day. She reports eating 3 meals per day, a typical portion size of 1/2 cup, eating 1-2 snacks per day, drinking 5+ or more 8-oz. glasses of water per day, no carbonated beverage consumption and exercising regularly.     Supplements: bmtv.     Review of Systems   Respiratory:  Negative for shortness of breath.    Cardiovascular:  Negative for chest pain.   All other systems reviewed and are negative.      Patient Active Problem List   Diagnosis    Splenomegaly    Social anxiety disorder    SAVANNAH (obstructive sleep apnea)     Multiple sclerosis    Migraine headache without aura    Large granular lymphocyte disorder    Leukocytosis    Immunocompromised    Hypersomnolence    Hearing loss in right ear    Eosinophilic esophagitis    ADD (attention deficit disorder)    Chronic fatigue    GERD (gastroesophageal reflux disease)    Gastroparesis as a child    Dysmenorrhea    Chronic bilateral low back pain with sciatica    Fibromyalgia    Trigeminal neuralgia    OCD (obsessive compulsive disorder)    Vertigo    Weakness    Depression    Fatty liver    EDS (Esdras-Danlos syndrome)    Pancolonic diverticulosis    Melanoma in situ    S/P laparoscopic sleeve gastrectomy with PEHR    Dietary counseling    Greater trochanteric bursitis of left hip    Iliotibial band syndrome affecting lower leg, left    Overweight with body mass index (BMI) 25.0-29.9    Pelvic pain    S/P laparoscopy    Dislocation of lacrimal gland       Past Medical History:   Diagnosis Date    ADHD     Anxiety and depression     Arthritis     Bilateral ovarian cysts     Chronic constipation     Crampy pain associated with menses     Difficulty swallowing pills     Diverticular disease     Dysmenorrhea     Dysphagia     Eczema     Eosinophilic esophagitis     Fatigue     Fatty liver     Fibromyalgia     GERD (gastroesophageal reflux disease)     Heavy menses     Hemorrhoids     Hiatal hernia     Large granular lymphocyte disorder     Melanoma     Migraine     Multiple sclerosis     receives IV infusions    Neck pain     Obesity, Class III, BMI 40-49.9 (morbid obesity)     OCD (obsessive compulsive disorder)     SAVANNAH (obstructive sleep apnea)     mild no machine    Paraesophageal hernia     Port-A-Cath in place     right side chest- FOR MS TREATMENTS EVERY 6 WEEKS    Rosacea     Splenomegaly     Trigeminal neuralgia        The following portions of the patient's history were reviewed and updated as appropriate: allergies, current medications, past medical history, past surgical  history, and problem list.    Vitals:    12/20/24 1331   BP: 113/72   Pulse: 90   Temp: 98.1 °F (36.7 °C)       Physical Exam  Vitals reviewed.   Constitutional:       General: She is not in acute distress.     Appearance: Normal appearance. She is well-developed, well-groomed and overweight.   HENT:      Head: Normocephalic and atraumatic.      Mouth/Throat:      Mouth: Mucous membranes are moist.      Pharynx: Oropharynx is clear.   Eyes:      General: No scleral icterus.     Extraocular Movements: Extraocular movements intact.      Conjunctiva/sclera: Conjunctivae normal.      Pupils: Pupils are equal, round, and reactive to light.   Cardiovascular:      Rate and Rhythm: Normal rate and regular rhythm.   Pulmonary:      Effort: Pulmonary effort is normal. No respiratory distress.   Abdominal:      General: Bowel sounds are normal.      Palpations: Abdomen is soft.   Musculoskeletal:         General: Normal range of motion.      Cervical back: Normal range of motion and neck supple.   Skin:     General: Skin is warm and dry.   Neurological:      General: No focal deficit present.      Mental Status: She is alert and oriented to person, place, and time.   Psychiatric:         Mood and Affect: Mood normal.         Behavior: Behavior normal. Behavior is cooperative.         Thought Content: Thought content normal.         Judgment: Judgment normal.         Assessment:   Post-op, the patient is doing well.  Had labs in Sept that all looked good so will hold off on repeating now.     Encounter Diagnoses   Name Primary?    Overweight with body mass index (BMI) 25.0-29.9 Yes    S/P laparoscopic sleeve gastrectomy with PEHR     Dietary counseling        Plan:   Diagnoses and all orders for this visit:    1. Overweight with body mass index (BMI) 25.0-29.9 (Primary)    2. S/P laparoscopic sleeve gastrectomy with PEHR    3. Dietary counseling      Encouraged patient to be sure to get plenty of lean protein per day through  small frequent meals all with a protein source.   Activity restrictions: none.   Recommended patient be sure to get at least 70 grams of protein per day by eating small, frequent meals all with high lean protein choices. Be sure to limit/cut back on daily carbohydrate intake. Discussed with the patient the recommended amount of water per day to intake- half of body weight in ounces. Reviewed vitamin requirements. Be sure to do routine exercise, 150 minutes per week minimum, including both cardio and strength training.     Instructions / Recommendations: dietary counseling recommended, recommended a daily protein intake of  grams, vitamin supplement(s) recommended, recommended exercising at least 150 minutes per week, behavior modifications recommended and instructed to call the office for concerns, questions, or problems.     The patient was instructed to follow up in 6 months.     Total time spent during this encounter today was 25 minutes

## 2025-07-02 ENCOUNTER — OFFICE VISIT (OUTPATIENT)
Dept: BARIATRICS/WEIGHT MGMT | Facility: CLINIC | Age: 40
End: 2025-07-02
Payer: COMMERCIAL

## 2025-07-02 VITALS
WEIGHT: 171 LBS | DIASTOLIC BLOOD PRESSURE: 85 MMHG | HEIGHT: 65 IN | HEART RATE: 87 BPM | TEMPERATURE: 98.2 F | SYSTOLIC BLOOD PRESSURE: 114 MMHG | BODY MASS INDEX: 28.49 KG/M2

## 2025-07-02 DIAGNOSIS — Z98.84 S/P LAPAROSCOPIC SLEEVE GASTRECTOMY: ICD-10-CM

## 2025-07-02 DIAGNOSIS — Z71.3 DIETARY COUNSELING: ICD-10-CM

## 2025-07-02 DIAGNOSIS — E66.3 OVERWEIGHT WITH BODY MASS INDEX (BMI) 25.0-29.9: Primary | ICD-10-CM

## 2025-07-02 RX ORDER — BACLOFEN 10 MG/1
10 TABLET ORAL 3 TIMES DAILY
COMMUNITY
Start: 2025-05-02

## 2025-07-02 RX ORDER — ONDANSETRON 4 MG/1
4 TABLET, ORALLY DISINTEGRATING ORAL EVERY 8 HOURS PRN
COMMUNITY
Start: 2025-04-16

## (undated) DEVICE — GLV SURG SENSICARE POLYISPRN W/ALOE PF LF 6.5 GRN STRL

## (undated) DEVICE — Device: Brand: STANDARD BOUGIE, 38FR

## (undated) DEVICE — NDL HYPO PRECISIONGLIDE REG 21G 1 1/2

## (undated) DEVICE — MANIP UTER KRONNER

## (undated) DEVICE — TROCAR: Brand: KII OPTICAL ACCESS SYSTEM

## (undated) DEVICE — VIOLET BRAIDED (POLYGLACTIN 910), SYNTHETIC ABSORBABLE SUTURE: Brand: COATED VICRYL

## (undated) DEVICE — ENSEAL LAPAROSCOPIC TISSUE SEALER G2 ARTICULATING CURVED JAW FOR USE WITH G2 GENERATOR 5MM DIAMETER 45CM SHAFT LENGTH: Brand: ENSEAL

## (undated) DEVICE — GLV SURG BIOGEL LTX PF 6

## (undated) DEVICE — CONN TBG Y 5 IN 1 LF STRL

## (undated) DEVICE — APPL HEMO SURG ARISTA/AH/FLEXITIP XL 38CM

## (undated) DEVICE — TROC STANDARDTROCAR FOR/TITANSGS 19MM DISP STRL

## (undated) DEVICE — SYR LUERLOK 20CC BX/50

## (undated) DEVICE — 500ML,PRESSURE INFUSER W/STOPCOCK: Brand: MEDLINE

## (undated) DEVICE — LOU GYN LAPAROSCOPY: Brand: MEDLINE INDUSTRIES, INC.

## (undated) DEVICE — ENDOPATH XCEL DILATING TIP TROCARS WITH STABILITY SLEEVES: Brand: ENDOPATH XCEL

## (undated) DEVICE — SEALANT WND FIBRIN TISSEEL PREFIL/SYR/PRIMAFZ 2ML

## (undated) DEVICE — LAPAROSCOPIC SMOKE FILTRATION SYSTEM: Brand: PALL LAPAROSHIELD® PLUS LAPAROSCOPIC SMOKE FILTRATION SYSTEM

## (undated) DEVICE — PATIENT RETURN ELECTRODE, SINGLE-USE, CONTACT QUALITY MONITORING, ADULT, WITH 9FT CORD, FOR PATIENTS WEIGING OVER 33LBS. (15KG): Brand: MEGADYNE

## (undated) DEVICE — DISPOSABLE MONOPOLAR ENDOSCOPIC CORD 10 FT. (3M): Brand: KIRWAN

## (undated) DEVICE — GLV SURG SENSICARE PI MIC PF SZ6 LF STRL

## (undated) DEVICE — APL DUPLOSPRAYER MIS 40CM

## (undated) DEVICE — GLV SURG SENSICARE PI MIC PF SZ8.5 LF STRL

## (undated) DEVICE — SOL NACL 0.9PCT 1000ML

## (undated) DEVICE — GLV SURG SENSICARE PI PF LF 8.5 GRN STRL

## (undated) DEVICE — LAPAROSCOPIC DISSECTOR: Brand: DEROYAL

## (undated) DEVICE — PK OSC LAP SLV 40

## (undated) DEVICE — ADHS SKIN SURG TISS VISC PREMIERPRO EXOFIN HI/VISC FAST/DRY

## (undated) DEVICE — SUT VIC 0 UR6 27IN VCP603H

## (undated) DEVICE — DECANTER BAG 9": Brand: MEDLINE INDUSTRIES, INC.

## (undated) DEVICE — ENDOPATH XCEL BLUNT TIP TROCARS WITH SMOOTH SLEEVES: Brand: ENDOPATH XCEL

## (undated) DEVICE — SYR LL TP 10ML STRL

## (undated) DEVICE — SUT MNCRYL PLS ANTIB UD 4/0 PS2 18IN